# Patient Record
Sex: MALE | Race: WHITE | URBAN - METROPOLITAN AREA
[De-identification: names, ages, dates, MRNs, and addresses within clinical notes are randomized per-mention and may not be internally consistent; named-entity substitution may affect disease eponyms.]

---

## 2017-08-25 ENCOUNTER — TELEPHONE (OUTPATIENT)
Dept: CARDIOLOGY CLINIC | Age: 72
End: 2017-08-25

## 2017-09-28 ENCOUNTER — OFFICE VISIT (OUTPATIENT)
Dept: SURGERY | Age: 72
End: 2017-09-28

## 2017-09-28 VITALS
BODY MASS INDEX: 35.78 KG/M2 | HEIGHT: 73 IN | WEIGHT: 270 LBS | DIASTOLIC BLOOD PRESSURE: 80 MMHG | SYSTOLIC BLOOD PRESSURE: 131 MMHG

## 2017-09-28 DIAGNOSIS — K43.9 VENTRAL HERNIA WITHOUT OBSTRUCTION OR GANGRENE: Primary | ICD-10-CM

## 2017-09-28 PROCEDURE — 99204 OFFICE O/P NEW MOD 45 MIN: CPT | Performed by: SURGERY

## 2017-09-28 RX ORDER — FUROSEMIDE 40 MG/1
TABLET ORAL
Refills: 2 | COMMUNITY
Start: 2017-09-01

## 2017-09-28 RX ORDER — ATORVASTATIN CALCIUM 10 MG/1
TABLET, FILM COATED ORAL
Refills: 3 | COMMUNITY
Start: 2017-09-08

## 2017-09-28 RX ORDER — MELOXICAM 15 MG/1
15 TABLET ORAL DAILY PRN
COMMUNITY
Start: 2017-09-01

## 2017-09-28 ASSESSMENT — ENCOUNTER SYMPTOMS
COLOR CHANGE: 0
PHOTOPHOBIA: 0
CHOKING: 0
STRIDOR: 0
BACK PAIN: 0
APNEA: 0
EYE ITCHING: 0
CONSTIPATION: 0
ANAL BLEEDING: 0
EYE REDNESS: 0
ABDOMINAL PAIN: 1
SORE THROAT: 0
RECTAL PAIN: 0

## 2017-09-29 ENCOUNTER — TELEPHONE (OUTPATIENT)
Dept: SURGERY | Age: 72
End: 2017-09-29

## 2017-10-04 ENCOUNTER — INITIAL CONSULT (OUTPATIENT)
Dept: CARDIOLOGY CLINIC | Age: 72
End: 2017-10-04

## 2017-10-04 VITALS
DIASTOLIC BLOOD PRESSURE: 76 MMHG | WEIGHT: 288 LBS | HEART RATE: 70 BPM | RESPIRATION RATE: 16 BRPM | HEIGHT: 73 IN | BODY MASS INDEX: 38.17 KG/M2 | SYSTOLIC BLOOD PRESSURE: 122 MMHG

## 2017-10-04 DIAGNOSIS — R60.9 EDEMA, UNSPECIFIED TYPE: Primary | ICD-10-CM

## 2017-10-04 DIAGNOSIS — R06.02 SHORTNESS OF BREATH: ICD-10-CM

## 2017-10-04 DIAGNOSIS — R60.9 EDEMA, UNSPECIFIED TYPE: ICD-10-CM

## 2017-10-04 PROCEDURE — 93000 ELECTROCARDIOGRAM COMPLETE: CPT | Performed by: INTERNAL MEDICINE

## 2017-10-04 PROCEDURE — 99204 OFFICE O/P NEW MOD 45 MIN: CPT | Performed by: INTERNAL MEDICINE

## 2017-10-04 RX ORDER — POTASSIUM CHLORIDE 20 MEQ/1
20 TABLET, EXTENDED RELEASE ORAL 2 TIMES DAILY
COMMUNITY
End: 2017-10-04 | Stop reason: SDUPTHER

## 2017-10-04 RX ORDER — POTASSIUM CHLORIDE 1.5 G/1.77G
20 POWDER, FOR SOLUTION ORAL 2 TIMES DAILY
Qty: 30 EACH | Refills: 3 | Status: SHIPPED | OUTPATIENT
Start: 2017-10-04 | End: 2017-10-04 | Stop reason: ALTCHOICE

## 2017-10-04 RX ORDER — POTASSIUM CHLORIDE 20 MEQ/1
20 TABLET, EXTENDED RELEASE ORAL 2 TIMES DAILY
Qty: 60 TABLET | Refills: 6 | Status: SHIPPED | OUTPATIENT
Start: 2017-10-04 | End: 2018-01-24

## 2017-10-04 NOTE — MR AVS SNAPSHOT
After Visit Summary             Leonidas Panchal   10/4/2017 9:50 AM   Initial consult    Description:  Male : 1945   Provider:  Nina Nava MD   Department:  Cardiology [facility] and Future Appointments         Below is a list of your follow-up and future appointments. This may not be a complete list as you may have made appointments directly with providers that we are not aware of or your providers may have made some for you. Please call your providers to confirm appointments. It is important to keep your appointments. Please bring your current insurance card, photo ID, co-pay, and all medication bottles to your appointment. If self-pay, payment is expected at the time of service. Your To-Do List     Future Appointments Provider Department Dept Phone    10/17/2017 9:00 AM SCHEDULE, [facility] NUCLEAR [facility] [address] [phone]    10/17/2017 1:00 PM SCHEDULE, [facility] ECHO [facility] [address] [phone]    An echocardiogram is a test that uses sound waves to create a moving picture of the heart. The picture is much more detailed than a plain x-ray [email or web address]  image and involves no radiation exposure. 10/17/2017 2:00 PM SCHEDULE, [facility] ECHO [facility] [address] [phone]    An echocardiogram is a test that uses sound waves to create a moving picture of the heart. The picture is much more detailed than a plain x-ray [email or web address]  image and involves no radiation exposure. 10/18/2017 10:40 AM Nina Nava MD Cardiology [facility] [phone]    Please arrive 15 minutes prior to appointment, bring photo ID and insurance card.     10/20/2017 1:00 PM PREADMISSION TEST RM 1 [facility] Pre-Admission Testing [phone]    10/24/2017 7:30 AM Abigail Acosta MD; OR [address] [facility] General Surgery [phone]    11/6/2017 10:00 AM Samia Christopher MD [facility] GENERAL SURGERY [phone]    Future Orders Complete By Expires    ECHO Complete 2D W Doppler W Color [52847 Custom]  10/4/2017 10/4/2018    NM Myocardial Spect Rest Exercise or Rx [66770 Custom]  10/4/2017 10/4/2018    VL DUP LOWER EXTREMITY VENOUS BILATERAL [20941 Custom]  10/4/2017 97/2/7851    Basic Metabolic Panel [XTZ27 Custom]  10/5/2017 (Approximate) 10/4/2018    Standing Orders Interval Expires    EKG 12 Lead [EKG1 Custom]   10/4/2019    Follow-Up    Return in about 2 weeks (around 10/18/2017). Information from Your Visit        Department     Name Address Phone Fax    Cardiology [facility] [address] [address] [phone]      You Were Seen for:         Comments    Edema, unspecified type   [8287645]         Vital Signs     Blood Pressure Pulse Respirations Height Weight Body Mass Index    122/76 (Site: Left Arm, Position: Sitting, Cuff Size: Large Adult) 79 16 6' 1\" (1.854 m) 288 lb (130.6 kg) 38 kg/m2    Smoking Status                   Former Smoker           Additional Information about your Body Mass Index (BMI)           Your BMI as listed above is considered obese (30 or more). BMI is an estimate of body fat, calculated from your height and weight. The higher your BMI, the greater your risk of heart disease, high blood pressure, type 2 diabetes, stroke, gallstones, arthritis, sleep apnea, and certain cancers. BMI is not perfect. It may overestimate body fat in athletes and people who are more muscular. Even a small weight loss (between 5 and 10 percent of your current weight) by decreasing your calorie intake and becoming more physically active will help lower your risk of developing or worsening diseases associated with obesity.      Learn more at: From The Bench.co.uk             Today's Medication Changes These changes are accurate as of: 10/4/17 10:24 AM.  If you have any questions, ask your nurse or doctor. START taking these medications           potassium chloride 20 MEQ packet   Commonly known as:  KLOR-CON   Instructions: Take 20 mEq by mouth 2 times daily   Quantity:  30 each   Refills:  3   Started by:  Iesha Dubose MD         STOP taking these medications           silver sulfADIAZINE 1 % cream   Commonly known as:  SILVADENE   Stopped by:  Iesha Dubose MD       UNABLE TO FIND   Stopped by:  Iesha Dubose MD            Where to Get Your Medications      These medications were sent to [facility] Jae Mcintyre, [address] [phone] Tho Contreras [phone]  [facility], [address] [address]     Phone:  [phone]     potassium chloride 20 MEQ packet               Your Current Medications Are              potassium chloride (KLOR-CON) 20 MEQ packet Take 20 mEq by mouth 2 times daily    atorvastatin (LIPITOR) 10 MG tablet TAKE 1 TABLET BY MOUTH AT BEDTIME    furosemide (LASIX) 40 MG tablet TAKE 1 TABLET(S) BY MOUTH DAILY    meloxicam (MOBIC) 15 MG tablet     amLODIPine (NORVASC) 5 MG tablet Take 5 mg by mouth every morning. losartan (COZAAR) 50 MG tablet Take 50 mg by mouth 2 times daily. metoprolol (TOPROL XL) 50 MG XL tablet Take 50 mg by mouth 2 times daily. hydrochlorothiazide (HYDRODIURIL) 25 MG tablet Take 25 mg by mouth every morning. cloNIDine (CATAPRES) 0.1 MG tablet Take 0.1 mg by mouth nightly. Multiple Vitamins-Minerals (MULTIVITAMIN PO) Take  by mouth every morning. Over The Counter    aspirin 81 MG tablet Take 81 mg by mouth nightly.  Over The Counter, Last Dose Taken 9-16-13 Due To Scheduled Surgery      Allergies              Codeine     \"Hallucinations\"    Other     \"Allergic To Lobster Causing Swelling\"    Tape Glendo Downs Tape]     \"Makes Skin Look Like It's Burnt, And Causes Blisters\"

## 2017-10-04 NOTE — PROGRESS NOTES
atorvastatin (LIPITOR) 10 MG tablet TAKE 1 TABLET BY MOUTH AT BEDTIME  3    furosemide (LASIX) 40 MG tablet TAKE 1 TABLET(S) BY MOUTH DAILY  2    meloxicam (MOBIC) 15 MG tablet       amLODIPine (NORVASC) 5 MG tablet Take 5 mg by mouth every morning.  losartan (COZAAR) 50 MG tablet Take 50 mg by mouth 2 times daily.  metoprolol (TOPROL XL) 50 MG XL tablet Take 50 mg by mouth 2 times daily.  hydrochlorothiazide (HYDRODIURIL) 25 MG tablet Take 25 mg by mouth every morning.  cloNIDine (CATAPRES) 0.1 MG tablet Take 0.1 mg by mouth nightly.  Multiple Vitamins-Minerals (MULTIVITAMIN PO) Take  by mouth every morning. Over The Counter      aspirin 81 MG tablet Take 81 mg by mouth nightly. Over The Counter, Last Dose Taken 9-16-13 Due To Scheduled Surgery       No current facility-administered medications for this visit. Review of Systems:   · Constitutional: No Fever or Weight Loss   · Eyes: No Decreased Vision  · ENT: No Headaches, Hearing Loss or Vertigo  · Cardiovascular: No chest pain, dyspnea on exertion, palpitations or loss of consciousness  · Respiratory: No cough or wheezing    · Gastrointestinal: No abdominal pain, appetite loss, blood in stools, constipation, diarrhea or heartburn  · Genitourinary: No dysuria, trouble voiding, or hematuria  · Musculoskeletal: + leg swelling, No gait disturbance, weakness or joint complaints  · Integumentary: No rash or pruritis  · Neurological: No TIA or stroke symptoms  · Psychiatric: No anxiety or depression  · Endocrine: No malaise, fatigue or temperature intolerance  · Hematologic/Lymphatic: No bleeding problems, blood clots or swollen lymph nodes  · Allergic/Immunologic: No nasal congestion or hives  All systems negative except as marked.      ·   ·      Physical Examination:    Vitals:    10/04/17 0952   BP: 122/76   Pulse: 70   Resp: 16    afebrile  Height 6'1''  Wt Readings from Last 3 Encounters:   10/04/17 288 lb (130.6 kg)   09/28/17 270

## 2017-10-05 ENCOUNTER — HOSPITAL ENCOUNTER (OUTPATIENT)
Dept: LAB | Age: 72
Discharge: HOME | End: 2017-10-05
Attending: INTERNAL MEDICINE | Admitting: INTERNAL MEDICINE

## 2017-10-05 LAB
ANION GAP SERPL CALCULATED.3IONS-SCNC: 9 MMOL/L (ref 4–16)
BUN BLDV-MCNC: 22 MG/DL (ref 6–23)
CALCIUM SERPL-MCNC: 9.3 MG/DL (ref 8.3–10.6)
CHLORIDE BLD-SCNC: 100 MMOL/L (ref 99–110)
CO2: 32 MMOL/L (ref 21–32)
CREAT SERPL-MCNC: 0.9 MG/DL (ref 0.9–1.3)
GFR AFRICAN AMERICAN: >60 ML/MIN/1.73M2
GFR NON-AFRICAN AMERICAN: >60 ML/MIN/1.73M2
GLUCOSE BLD-MCNC: 99 MG/DL (ref 70–140)
POTASSIUM SERPL-SCNC: 4.6 MMOL/L (ref 3.5–5.1)
SODIUM BLD-SCNC: 141 MMOL/L (ref 135–145)

## 2017-10-10 ENCOUNTER — HOSPITAL ENCOUNTER (OUTPATIENT)
Dept: GENERAL RADIOLOGY | Age: 72
Discharge: HOME | End: 2017-10-10
Attending: PODIATRIST | Admitting: PODIATRIST

## 2017-10-10 DIAGNOSIS — G89.29 CHRONIC PAIN OF TOE OF RIGHT FOOT: ICD-10-CM

## 2017-10-10 DIAGNOSIS — M79.674 CHRONIC PAIN OF TOE OF RIGHT FOOT: ICD-10-CM

## 2017-10-11 ENCOUNTER — TELEPHONE (OUTPATIENT)
Dept: CARDIOLOGY CLINIC | Age: 72
End: 2017-10-11

## 2017-10-11 NOTE — TELEPHONE ENCOUNTER
Returned call to Marciano Argue his potassium is 4.6 and he has not been taking any potassium. I instructed him to start taking potassium once a day.

## 2017-10-18 ENCOUNTER — OFFICE VISIT (OUTPATIENT)
Dept: CARDIOLOGY CLINIC | Age: 72
End: 2017-10-18

## 2017-10-18 ENCOUNTER — PROCEDURE VISIT (OUTPATIENT)
Dept: CARDIOLOGY CLINIC | Age: 72
End: 2017-10-18

## 2017-10-18 VITALS
HEART RATE: 72 BPM | BODY MASS INDEX: 38.57 KG/M2 | SYSTOLIC BLOOD PRESSURE: 124 MMHG | DIASTOLIC BLOOD PRESSURE: 80 MMHG | RESPIRATION RATE: 16 BRPM | WEIGHT: 291 LBS | HEIGHT: 73 IN

## 2017-10-18 DIAGNOSIS — R06.02 SHORTNESS OF BREATH: ICD-10-CM

## 2017-10-18 DIAGNOSIS — R60.9 EDEMA, UNSPECIFIED TYPE: Primary | ICD-10-CM

## 2017-10-18 DIAGNOSIS — M79.89 LEG SWELLING: Primary | ICD-10-CM

## 2017-10-18 DIAGNOSIS — R60.9 EDEMA, UNSPECIFIED TYPE: ICD-10-CM

## 2017-10-18 DIAGNOSIS — R06.02 SHORTNESS OF BREATH: Primary | ICD-10-CM

## 2017-10-18 LAB
LV EF: 58 %
LV EF: 60 %
LVEF MODALITY: NORMAL
LVEF MODALITY: NORMAL

## 2017-10-18 PROCEDURE — 93015 CV STRESS TEST SUPVJ I&R: CPT | Performed by: INTERNAL MEDICINE

## 2017-10-18 PROCEDURE — 93306 TTE W/DOPPLER COMPLETE: CPT | Performed by: INTERNAL MEDICINE

## 2017-10-18 PROCEDURE — A9500 TC99M SESTAMIBI: HCPCS | Performed by: INTERNAL MEDICINE

## 2017-10-18 PROCEDURE — 93970 EXTREMITY STUDY: CPT | Performed by: INTERNAL MEDICINE

## 2017-10-18 PROCEDURE — 78452 HT MUSCLE IMAGE SPECT MULT: CPT | Performed by: INTERNAL MEDICINE

## 2017-10-18 PROCEDURE — 99214 OFFICE O/P EST MOD 30 MIN: CPT | Performed by: INTERNAL MEDICINE

## 2017-10-18 NOTE — PROGRESS NOTES
Romulo Delatorre MD        OFFICE  FOLLOWUP NOTE    Chief complaints: patient is here for management of preop,leg swelling, HTN, dyslipidemia    Subjective: patient feels better, no chest pain, no shortness of breath, no dizziness, no palpitations    HPI Porfirio Sanchez is a 81 y. o.year old who  has a past medical history of Anemia; Back problem; Diabetes mellitus ([address] Fall; [place] (hard of hearing); HX OTHER MEDICAL; Hyperlipidemia; Hypertension; OCD (obsessive compulsive disorder); Osteomyel[address] ([facility] [address] Pre-diabetes; Shortness of breath on exertion; and Wears glasses. and presents for management of preop,leg swelling, HTN, dyslipidemia  , which are well controlled      Current Outpatient Prescriptions   Medication Sig Dispense Refill    potassium chloride (KLOR-CON M) 20 MEQ extended release tablet Take 1 tablet by mouth 2 times daily 60 tablet 6    atorvastatin (LIPITOR) 10 MG tablet TAKE 1 TABLET BY MOUTH AT BEDTIME  3    furosemide (LASIX) 40 MG tablet TAKE 1 TABLET(S) BY MOUTH DAILY  2    meloxicam (MOBIC) 15 MG tablet       amLODIPine (NORVASC) 5 MG tablet Take 5 mg by mouth every morning.  losartan (COZAAR) 50 MG tablet Take 50 mg by mouth 2 times daily.  metoprolol (TOPROL XL) 50 MG XL tablet Take 50 mg by mouth 2 times daily.  hydrochlorothiazide (HYDRODIURIL) 25 MG tablet Take 25 mg by mouth every morning.  cloNIDine (CATAPRES) 0.1 MG tablet Take 0.1 mg by mouth nightly.  Multiple Vitamins-Minerals (MULTIVITAMIN PO) Take  by mouth every morning. Over The Counter      aspirin 81 MG tablet Take 81 mg by mouth nightly. Over The Counter, Last Dose Taken 9-16-13 Due To Scheduled Surgery       No current facility-administered medications for this visit. Allergies: Codeine;  Other; and Tape [adhesive tape]  Past Medical History:   Diagnosis Date    Anemia 1982    \"While In The Hospital\"    Back problem     \"Back Goes Out Sometimes, Have Couple Herniated Discs That Cause My Feet To Go Numb\"    Diabetes mellitus ([address]     \"Borderline\"    Fall 1982    \"Fell Off Ladder\"   [place] (hard of hearing)     \"Both Ears\"    HX OTHER MEDICAL     Primary Care Physician Is Dr[facility] Carloz [facility]app [facility]    Hyperlipidemia     Hypertension     OCD (obsessive compulsive disorder)     Osteomyelitis [address] [address]     Pre-diabetes     Shortness of breath on exertion     Wears glasses      Past Surgical History:   Procedure Laterality Date    AMPUTATION      COLONOSCOPY  2000's    FRACTURE SURGERY Left 1982    Broken Left Ankle, Fell Off A Ladder    FRACTURE SURGERY Right 1982    \"Crushed Heel Right Foot, Fell Off A Ladder\"     Family History   Problem Relation Age of Onset    Asthma Mother     Cancer Mother      \"Skin Cancer\"   Chloé Mcmillan Hearing Loss Mother     Heart Disease Mother      \"Pacemaker\"    Cancer Father      \"Lymphoma\"    Other Father      \"Back Problems\"    Early Death Brother 64     \"Pulmonary Embolism\"    Asthma Brother     Other Brother      \"Complications From Being Shot Up In [place]"     Social History   Substance Use Topics    Smoking status: Former Smoker     Packs/day: 2.00     Years: 9.00     Start date: 9/17/1966     Quit date: 1/16/1975    Smokeless tobacco: Never Used    Alcohol use No      [unfilled]  Review of Systems:   · Constitutional: No Fever or Weight Loss   · Eyes: No Decreased Vision  · ENT: No Headaches, Hearing Loss or Vertigo  · Cardiovascular: No chest pain, dyspnea on exertion, palpitations or loss of consciousness  · Respiratory: No cough or wheezing    · Gastrointestinal: No abdominal pain, appetite loss, blood in stools, constipation, diarrhea or heartburn  · Genitourinary: No dysuria, trouble voiding, or hematuria  · Musculoskeletal: + leg swelling,.  No gait disturbance, weakness or joint complaints  · Integumentary: No rash or pruritis  · Neurological: No TIA or stroke symptoms  · Psychiatric: No anxiety or depression  · Endocrine: No malaise, fatigue or temperature intolerance  · Hematologic/Lymphatic: No bleeding problems, blood clots or swollen lymph nodes  · Allergic/Immunologic: No nasal congestion or hives  All systems negative except as marked. Objective:  /80 (Site: Left Arm, Position: Sitting, Cuff Size: Large Adult)   Pulse 72   Resp 16   Ht 6' 1\" (1.854 m)   Wt 291 lb (132 kg)   BMI 38.39 kg/m²   Wt Readings from Last 3 Encounters:   10/18/17 291 lb (132 kg)   10/04/17 288 lb (130.6 kg)   09/28/17 270 lb (122.5 kg)     Body mass index is 38.39 kg/m². GENERAL - Alert, oriented, pleasant, in no apparent distress,normal grooming  HEENT  pupils are reactive to light and accomodation, cornea intact, conjunctive normal color, ears are normal in exam,throat exam in normal, teeth, gum and palate are normal, oral mucosa is normal without any notation of pallor or cyanosis  Neck - Supple. No jugular venous distention noted. No carotid bruits, no apical -carotid delay  Respiratory:  Normal breath sounds, No respiratory distress, No wheezing, No chest tenderness. ,no use of accessory muscles, diaphragm movement is normal  Cardiovascular: (PMI) apex non displaced,no lifts no thrills, no s3,no s4, Normal heart rate, Normal rhythm, No murmurs, No rubs, No gallops. Carotid arteries pulse and amplitude are normal no bruit, no abdominal bruit noted ( normal abdominal aorta ausculation), femoral arteries pulse and amplitude are normal no bruit, pedal pulses are normal  Femoral pulses have normal amplitude, no bruits   Extremities - No cyanosis, clubbing, or significant edema, no varicose veins    Abdomen  No masses, tenderness, or organomegaly, no hepato-splenomegally, no bruits  Musculoskeletal  + edema, no kyphosis or scoliosis, no deformity in any extremity noted, muscle strength and tone are normal  Skin: no ulcer,no scar,no stasis dermatitis   Neurologic  alert oriented times 3,Cranial nerves II through XII are grossly intact.   There were no gross focal neurologic abnormalities. All sensory and motor nerves examined and were normal  Psychiatric: normal mood and affect    No results found for: CKTOTAL, CKMB, CKMBINDEX, TROPONINI  BNP:  No results found for: BNP  PT/INR:  No results found for: PTINR  No results found for: LABA1C  No results found for: CHOL, TRIG, HDL, LDLCALC, LDLDIRECT  No results found for: ALT, AST  TSH:  No results found for: TSH    Impression:  Massimo Bansal is a 81 y. o.year old who  has a past medical history of Anemia; Back problem; Diabetes mellitus ([address] Fall; [place] (hard of hearing); HX OTHER MEDICAL; Hyperlipidemia; Hypertension; OCD (obsessive compulsive disorder); Osteomyelitis ([facility] [address] Pre-diabetes; Shortness of breath on exertion; and Wears glasses. and presents with     Plan:  1. Shortness of breath: stress test and echo are normal, recommend to lose weight  2. preop for ventral hernia: cleared for surgery  3. Leg swelling: venous doppler showed venous reflux, will recommend compression socks, and if fail to improve,will recommend venous ablation. norvasc can also cause leg swelling. 4. Pre diabetes: recommend to lose weight  5. Obesity: will start adipex after stress test  6. Dyslipidemia: stable continue statins  7. HTN: stable, continue lopressor, losartan, hctz and clonidine medicatons  1. Patient takes 2 diuretics, will recommend to check potassium and add kcl as neededHealth maintenance: exerise and diet  All labs, medications and tests reviewed, continue all other medications of all above medical condition listed as is.     [unfilled]

## 2017-10-20 ENCOUNTER — HOSPITAL ENCOUNTER (OUTPATIENT)
Dept: PREADMISSION TESTING | Age: 72
Discharge: HOME | End: 2017-10-20
Attending: SURGERY | Admitting: SURGERY

## 2017-10-20 VITALS
WEIGHT: 291 LBS | SYSTOLIC BLOOD PRESSURE: 140 MMHG | HEIGHT: 73 IN | OXYGEN SATURATION: 92 % | TEMPERATURE: 97.6 F | BODY MASS INDEX: 38.57 KG/M2 | HEART RATE: 80 BPM | DIASTOLIC BLOOD PRESSURE: 72 MMHG | RESPIRATION RATE: 16 BRPM

## 2017-11-06 ENCOUNTER — OFFICE VISIT (OUTPATIENT)
Dept: SURGERY | Age: 72
End: 2017-11-06

## 2017-11-06 VITALS
SYSTOLIC BLOOD PRESSURE: 118 MMHG | WEIGHT: 280 LBS | DIASTOLIC BLOOD PRESSURE: 71 MMHG | BODY MASS INDEX: 37.11 KG/M2 | HEART RATE: 76 BPM | HEIGHT: 73 IN

## 2017-11-06 DIAGNOSIS — K43.9 VENTRAL HERNIA WITHOUT OBSTRUCTION OR GANGRENE: Primary | ICD-10-CM

## 2017-11-06 PROCEDURE — 99024 POSTOP FOLLOW-UP VISIT: CPT | Performed by: SURGERY

## 2017-11-20 ENCOUNTER — OFFICE VISIT (OUTPATIENT)
Dept: SURGERY | Age: 72
End: 2017-11-20

## 2017-11-20 VITALS
WEIGHT: 280 LBS | BODY MASS INDEX: 37.11 KG/M2 | SYSTOLIC BLOOD PRESSURE: 127 MMHG | DIASTOLIC BLOOD PRESSURE: 76 MMHG | HEART RATE: 88 BPM | HEIGHT: 73 IN

## 2017-11-20 DIAGNOSIS — K43.9 VENTRAL HERNIA WITHOUT OBSTRUCTION OR GANGRENE: Primary | ICD-10-CM

## 2017-11-20 PROCEDURE — 99024 POSTOP FOLLOW-UP VISIT: CPT | Performed by: SURGERY

## 2017-11-20 NOTE — PROGRESS NOTES
Chief Complaint   Patient presents with    Post-Op Check     c/c-post op check- open [facility] 10/24/17         SUBJECTIVE:  Patient here for post op visit s/pOpen ventral Herniorraphy with mesh, 10/24/17     . This is 2nd visit. Pain is minimal.  Wounds: min bruising and no discharge. OBJECTIVE:  Physical Exam   Constitutional: He is oriented to person, place, and time. He appears well-developed and well-nourished. No distress. Abdominal: Soft. Bowel sounds are normal. He exhibits no distension and no mass. There is no tenderness. There is no rebound and no guarding. Neurological: He is alert and oriented to person, place, and time. He has normal reflexes. He displays normal reflexes. No cranial nerve deficit. He exhibits normal muscle tone. Coordination normal.   Skin: Skin is warm and dry. No rash noted. He is not diaphoretic. No erythema. No pallor. Wound well healed without signs of active infection. Suture line intact. Abdomen soft, nontender, nondistended. ASSESSMENT:  Patient doing well on this post operative check. Wounds well healed. 1. Ventral hernia without obstruction or gangrene        PLAN:  Continue same  Increase activity as tolerated  F/U PRN        No orders of the defined types were placed in this encounter. No orders of the defined types were placed in this encounter. Follow Up: Return if symptoms worsen or fail to improve.     Candido Calderon PA-C

## 2018-01-24 ENCOUNTER — OFFICE VISIT (OUTPATIENT)
Dept: CARDIOLOGY CLINIC | Age: 73
End: 2018-01-24

## 2018-01-24 VITALS
WEIGHT: 278 LBS | HEIGHT: 73 IN | SYSTOLIC BLOOD PRESSURE: 122 MMHG | HEART RATE: 64 BPM | BODY MASS INDEX: 36.84 KG/M2 | DIASTOLIC BLOOD PRESSURE: 76 MMHG

## 2018-01-24 DIAGNOSIS — I10 ESSENTIAL HYPERTENSION: Primary | ICD-10-CM

## 2018-01-24 PROCEDURE — 99214 OFFICE O/P EST MOD 30 MIN: CPT | Performed by: INTERNAL MEDICINE

## 2018-01-24 NOTE — PROGRESS NOTES
Wing Aracelis MD        OFFICE  FOLLOWUP NOTE    Chief complaints: patient is here for management of post op hernia repair,leg swelling, HTN, dyslipidemia    Subjective: patient feels better, no chest pain, no shortness of breath, no dizziness, no palpitations    HPI Carlene Patino is a 81 y. o.year old who  has a past medical history of Anemia; Back problem; Diabetes mellitus ([facility] [address] Fall; H/O echocardiogram; History of nuclear stress test; [place] (hard of hearing); HX OTHER MEDICAL; Hyperlipidemia; Hypertension; Leg swelling; OCD (obsessive compulsive disorder); Osteomyelitis ([facility] [address] Pre-diabetes; Seasonal allergies; Shortness of breath on exertion; and Wears glasses. and presents for management of post op hernia repair,leg swelling, HTN, dyslipidemia  which are well controlled      Current Outpatient Prescriptions   Medication Sig Dispense Refill    Omega-3 Fatty Acids (EQL OMEGA 3 FISH OIL) 1400 MG CAPS Take 1 capsule by mouth daily      Compression Stockings MISC by Does not apply route 1 each 0    atorvastatin (LIPITOR) 10 MG tablet TAKE 1 TABLET BY MOUTH AT BEDTIME  3    furosemide (LASIX) 40 MG tablet TAKE 1 TABLET(S) BY MOUTH DAILY   pt takes in evening  2    meloxicam (MOBIC) 15 MG tablet 15 mg daily as needed       amLODIPine (NORVASC) 5 MG tablet Take 5 mg by mouth every morning.  losartan (COZAAR) 50 MG tablet Take 50 mg by mouth 2 times daily.  metoprolol (TOPROL XL) 50 MG XL tablet Take 50 mg by mouth daily Pt takes 1/2 tab, twice daily.  hydrochlorothiazide (HYDRODIURIL) 25 MG tablet Take 25 mg by mouth every morning.  cloNIDine (CATAPRES) 0.1 MG tablet Take 0.1 mg by mouth nightly.  Multiple Vitamins-Minerals (MULTIVITAMIN PO) Take  by mouth every morning. Over The Counter      aspirin 81 MG tablet Take 81 mg by mouth nightly Over The Counter       No current facility-administered medications for this visit. Allergies:  Other; Codeine; and Tape Yvrose Lerma tape]  Past Medical History:   Diagnosis Date    Anemia 1982    \"While In The Hospital\"    Back problem     \"Back Goes Out Sometimes, Have Couple Herniated Discs That Cause My Feet To Go Numb\"    Diabetes mellitus ([address]     \"Borderline\"    Fall 1982    \"Fell Off Ladder\" -- Lt ankle and Rt heel fractures    H/O echocardiogram 10/18/2017    EF 55-60. Grade I diastolic dysfunction. Mild concentric left ventricular hypertrophy. The left atrium is mildly dilated. No significant valvulopathy seen.  History of nuclear stress test 10/18/2017    EF 60%. Normal study.     [place] (hard of hearing)     \"Both Ears\"    HX OTHER MEDICAL     Primary Care Physician Is Dr. Roni Mendiolaman, [facility]    Hyperlipidemia     Hypertension     Leg swelling     Rt leg greater than Lt--reduces somewhat overnight    OCD (obsessive compulsive disorder)     Osteomyelitis ([address]     tip of Rt 2nd toe--was amputated in 2013    Pre-diabetes     Seasonal allergies     hayfever    Shortness of breath on exertion     Wears glasses      Past Surgical History:   Procedure Laterality Date    AMPUTATION  09/20/2013    Right 2nd toe--distal tip     COLONOSCOPY  2000's    FRACTURE SURGERY Left 1982    Broken Left Ankle, Fell Off A Ladder    FRACTURE SURGERY Right 1982    \"Crushed Heel Right Foot, Fell Off A Ladder\"    VENTRAL HERNIA REPAIR  10/24/2017     Family History   Problem Relation Age of Onset    Asthma Mother     Cancer Mother      \"Skin Cancer\"   Freda Gourd Hearing Loss Mother     Heart Disease Mother      \"Pacemaker\"    Cancer Father      \"Lymphoma\"    Other Father      \"Back Problems\"    Early Death Brother 64     \"Pulmonary Embolism\"    Asthma Brother     Other Brother      \"Complications From Being Shot Up In [employer]"     Social History   Substance Use Topics    Smoking status: Former Smoker     Packs/day: 2.00     Years: 9.00     Start date: 9/17/1966     Quit date: 1/16/1975    Smokeless tobacco: Never Used    Alcohol use

## 2018-02-18 PROBLEM — L03.031 CELLULITIS OF THIRD TOE OF RIGHT FOOT: Status: ACTIVE | Noted: 2018-02-18

## 2018-02-21 PROBLEM — L03.031 CELLULITIS OF THIRD TOE OF RIGHT FOOT: Status: RESOLVED | Noted: 2018-02-18 | Resolved: 2018-02-21

## 2018-03-28 ENCOUNTER — OFFICE VISIT (OUTPATIENT)
Dept: CARDIOLOGY CLINIC | Age: 73
End: 2018-03-28

## 2018-03-28 ENCOUNTER — HOSPITAL ENCOUNTER (OUTPATIENT)
Dept: GENERAL RADIOLOGY | Age: 73
Discharge: HOME | End: 2018-03-28
Attending: PODIATRIST | Admitting: PODIATRIST

## 2018-03-28 VITALS
SYSTOLIC BLOOD PRESSURE: 122 MMHG | HEIGHT: 73 IN | RESPIRATION RATE: 14 BRPM | DIASTOLIC BLOOD PRESSURE: 78 MMHG | HEART RATE: 64 BPM | BODY MASS INDEX: 36.71 KG/M2 | WEIGHT: 277 LBS

## 2018-03-28 DIAGNOSIS — M20.41 ACQUIRED HAMMER TOE OF RIGHT FOOT: ICD-10-CM

## 2018-03-28 DIAGNOSIS — M79.671 RIGHT FOOT PAIN: ICD-10-CM

## 2018-03-28 DIAGNOSIS — L97.511 RIGHT FOOT ULCER, LIMITED TO BREAKDOWN OF SKIN (HCC): ICD-10-CM

## 2018-03-28 DIAGNOSIS — R60.9 EDEMA, UNSPECIFIED TYPE: ICD-10-CM

## 2018-03-28 PROCEDURE — 99214 OFFICE O/P EST MOD 30 MIN: CPT | Performed by: INTERNAL MEDICINE

## 2018-03-28 PROCEDURE — 93000 ELECTROCARDIOGRAM COMPLETE: CPT | Performed by: INTERNAL MEDICINE

## 2018-03-28 NOTE — PROGRESS NOTES
facility-administered medications for this visit. Allergies: Other; Codeine; and Tape [adhesive tape]  Past Medical History:   Diagnosis Date    Anemia 1982    \"While In The Hospital\"    Back problem     \"Back Goes Out Sometimes, Have Couple Herniated Discs That Cause My Feet To Go Numb\"    Diabetes mellitus ([address]     \"Borderline\"    Fall 1982    \"Fell Off Ladder\" -- Lt ankle and Rt heel fractures    H/O echocardiogram 10/18/2017    EF 55-60. Grade I diastolic dysfunction. Mild concentric left ventricular hypertrophy. The left atrium is mildly dilated. No significant valvulopathy seen.  History of nuclear stress test 10/18/2017    EF 60%. Normal study.     [place] (hard of hearing)     \"Both Ears\"    HX OTHER MEDICAL     Primary Care Physician Is Dr. Alberto [facility]    Hyperlipidemia     Hypertension     Leg swelling     Rt leg greater than Lt--reduces somewhat overnight    OCD (obsessive compulsive disorder)     Osteomyelitis ([address]     tip of Rt 2nd toe--was amputated in 2013    Pre-diabetes     Seasonal allergies     hayfever    Shortness of breath on exertion     Wears glasses      Past Surgical History:   Procedure Laterality Date    AMPUTATION  09/20/2013    Right 2nd toe--distal tip     COLONOSCOPY  2000's    FRACTURE SURGERY Left 1982    Broken Left Ankle, Fell Off A Ladder    FRACTURE SURGERY Right 1982    \"Crushed Heel Right Foot, Fell Off A Ladder\"    VENTRAL HERNIA REPAIR  10/24/2017     Family History   Problem Relation Age of Onset    Asthma Mother     Cancer Mother      \"Skin Cancer\"   Aguilar Falter Hearing Loss Mother     Heart Disease Mother      \"Pacemaker\"    Cancer Father      \"Lymphoma\"    Other Father      \"Back Problems\"    Early Death Brother 64     \"Pulmonary Embolism\"    Asthma Brother     Other Brother      \"Complications From Being Shot Up In [place]"     Social History   Substance Use Topics    Smoking status: Former Smoker     Packs/day: 2.00     Years: 9.00 thrills, no s3,no s4, Normal heart rate, Normal rhythm, No murmurs, No rubs, No gallops. Carotid arteries pulse and amplitude are normal no bruit, no abdominal bruit noted ( normal abdominal aorta ausculation), femoral arteries pulse and amplitude are normal no bruit, pedal pulses are normal  Femoral pulses have normal amplitude, no bruits   Extremities - No cyanosis, clubbing, or significant edema, no varicose veins    Abdomen  No masses, tenderness, or organomegaly, no hepato-splenomegally, no bruits  Musculoskeletal  No significant edema, no kyphosis or scoliosis, no deformity in any extremity noted, muscle strength and tone are normal  Skin: no ulcer,no scar,no stasis dermatitis   Neurologic  alert oriented times 3,Cranial nerves II through XII are grossly intact. There were no gross focal neurologic abnormalities. All sensory and motor nerves examined and were normal  Psychiatric: normal mood and affect    No results found for: CKTOTAL, CKMB, CKMBINDEX, TROPONINI  BNP:  No results found for: BNP  PT/INR:  No results found for: PTINR  Lab Results   Component Value Date    LABA1C 5.8 02/19/2018     No results found for: CHOL, TRIG, HDL, LDLCALC, LDLDIRECT  Lab Results   Component Value Date    ALT 25 02/18/2018    AST 23 02/18/2018     TSH:  No results found for: TSH    Impression:  An Roberts is a 72 y. o.year old who  has a past medical history of Anemia; Back problem; Diabetes mellitus ([address] Fall; H/O echocardiogram; History of nuclear stress test; [place] (hard of hearing); HX OTHER MEDICAL; Hyperlipidemia; Hypertension; Leg swelling; OCD (obsessive compulsive disorder); Osteomyelitis ([address] Pre-diabetes; Seasonal allergies; Shortness of breath on exertion; and Wears glasses. and presents with     Plan:  1. preop: ok from cardiac standpoint on medium risk  2. Pre diabetes: recommend to lose weight  3. Obesity: patient is reluctant to start adipex  4. Dyslipidemia: stable continue statins  5.  HTN: stable, continue lopressor, losartan, hctz and clonidine   6. Health maintenance: exerise and diet  All labs, medications and tests reviewed, continue all other medications of all above medical condition listed as is.     [unfilled]

## 2018-04-02 ENCOUNTER — TELEPHONE (OUTPATIENT)
Dept: CARDIOLOGY CLINIC | Age: 73
End: 2018-04-02

## 2018-04-25 ENCOUNTER — HOSPITAL ENCOUNTER (OUTPATIENT)
Dept: LAB | Age: 73
Discharge: HOME | End: 2018-04-25
Attending: PODIATRIST | Admitting: PODIATRIST

## 2018-04-25 DIAGNOSIS — Z01.818 PREOP EXAMINATION: ICD-10-CM

## 2018-04-25 LAB
ANION GAP SERPL CALCULATED.3IONS-SCNC: 14 MMOL/L (ref 4–16)
BUN BLDV-MCNC: 22 MG/DL (ref 6–23)
CALCIUM SERPL-MCNC: 9.2 MG/DL (ref 8.3–10.6)
CHLORIDE BLD-SCNC: 101 MMOL/L (ref 99–110)
CO2: 26 MMOL/L (ref 21–32)
CREAT SERPL-MCNC: 0.9 MG/DL (ref 0.9–1.3)
EKG ATRIAL RATE: 59 BPM
EKG DIAGNOSIS: NORMAL
EKG P AXIS: 55 DEGREES
EKG P-R INTERVAL: 200 MS
EKG Q-T INTERVAL: 436 MS
EKG QRS DURATION: 110 MS
EKG QTC CALCULATION (BAZETT): 431 MS
EKG R AXIS: -38 DEGREES
EKG T AXIS: 11 DEGREES
EKG VENTRICULAR RATE: 59 BPM
GFR AFRICAN AMERICAN: >60 ML/MIN/1.73M2
GFR NON-AFRICAN AMERICAN: >60 ML/MIN/1.73M2
GLUCOSE BLD-MCNC: 93 MG/DL (ref 70–99)
HCT VFR BLD CALC: 44.9 % (ref 42–52)
HEMOGLOBIN: 14.4 GM/DL (ref 13.5–18)
MCH RBC QN AUTO: 28.3 PG (ref 27–31)
MCHC RBC AUTO-ENTMCNC: 32.1 % (ref 32–36)
MCV RBC AUTO: 88.4 FL (ref 78–100)
PDW BLD-RTO: 15 % (ref 11.7–14.9)
PLATELET # BLD: 221 K/CU MM (ref 140–440)
PMV BLD AUTO: 9.4 FL (ref 7.5–11.1)
POTASSIUM SERPL-SCNC: 4.5 MMOL/L (ref 3.5–5.1)
RBC # BLD: 5.08 M/CU MM (ref 4.6–6.2)
SODIUM BLD-SCNC: 141 MMOL/L (ref 135–145)
WBC # BLD: 8.5 K/CU MM (ref 4–10.5)

## 2018-06-27 ENCOUNTER — OFFICE VISIT (OUTPATIENT)
Dept: CARDIOLOGY CLINIC | Age: 73
End: 2018-06-27

## 2018-06-27 VITALS
HEART RATE: 64 BPM | HEIGHT: 73 IN | BODY MASS INDEX: 38.3 KG/M2 | SYSTOLIC BLOOD PRESSURE: 120 MMHG | DIASTOLIC BLOOD PRESSURE: 76 MMHG | WEIGHT: 289 LBS

## 2018-06-27 DIAGNOSIS — E66.09 CLASS 1 OBESITY DUE TO EXCESS CALORIES WITH SERIOUS COMORBIDITY IN ADULT, UNSPECIFIED BMI: ICD-10-CM

## 2018-06-27 DIAGNOSIS — I10 ESSENTIAL HYPERTENSION: Primary | ICD-10-CM

## 2018-06-27 PROCEDURE — 99214 OFFICE O/P EST MOD 30 MIN: CPT | Performed by: INTERNAL MEDICINE

## 2018-06-27 RX ORDER — PHENTERMINE HYDROCHLORIDE 37.5 MG/1
37.5 TABLET ORAL
Qty: 30 TABLET | Refills: 0 | Status: SHIPPED | OUTPATIENT
Start: 2018-06-27 | End: 2018-07-25 | Stop reason: SDUPTHER

## 2018-07-25 ENCOUNTER — OFFICE VISIT (OUTPATIENT)
Dept: CARDIOLOGY CLINIC | Age: 73
End: 2018-07-25

## 2018-07-25 VITALS
OXYGEN SATURATION: 94 % | HEIGHT: 73 IN | SYSTOLIC BLOOD PRESSURE: 138 MMHG | BODY MASS INDEX: 35.92 KG/M2 | DIASTOLIC BLOOD PRESSURE: 80 MMHG | WEIGHT: 271 LBS | HEART RATE: 68 BPM

## 2018-07-25 DIAGNOSIS — E66.09 CLASS 1 OBESITY DUE TO EXCESS CALORIES WITH SERIOUS COMORBIDITY IN ADULT, UNSPECIFIED BMI: ICD-10-CM

## 2018-07-25 PROCEDURE — 99214 OFFICE O/P EST MOD 30 MIN: CPT | Performed by: INTERNAL MEDICINE

## 2018-07-25 RX ORDER — PHENTERMINE HYDROCHLORIDE 37.5 MG/1
37.5 TABLET ORAL
Qty: 30 TABLET | Refills: 0 | Status: SHIPPED | OUTPATIENT
Start: 2018-07-25 | End: 2018-08-22 | Stop reason: SDUPTHER

## 2018-07-25 NOTE — PROGRESS NOTES
Early Death Brother 64        \"Pulmonary Embolism\"    Asthma Brother     Other Brother         \"Complications From Being Shot Up In [place]"     Social History   Substance Use Topics    Smoking status: Former Smoker     Packs/day: 2.00     Years: 9.00     Start date: 9/17/1966     Quit date: 1/16/1975    Smokeless tobacco: Never Used    Alcohol use No      [unfilled]  Review of Systems:   · Constitutional: No Fever or Weight Loss   · Eyes: No Decreased Vision  · ENT: No Headaches, Hearing Loss or Vertigo  · Cardiovascular: No chest pain, dyspnea on exertion, palpitations or loss of consciousness  · Respiratory: No cough or wheezing    · Gastrointestinal: No abdominal pain, appetite loss, blood in stools, constipation, diarrhea or heartburn  · Genitourinary: No dysuria, trouble voiding, or hematuria  · Musculoskeletal:  No gait disturbance, weakness or joint complaints  · Integumentary: No rash or pruritis  · Neurological: No TIA or stroke symptoms  · Psychiatric: No anxiety or depression  · Endocrine: No malaise, fatigue or temperature intolerance  · Hematologic/Lymphatic: No bleeding problems, blood clots or swollen lymph nodes  · Allergic/Immunologic: No nasal congestion or hives  All systems negative except as marked. Objective:  /80 (Position: Sitting, Cuff Size: Large Adult)   Pulse 68   Ht 6' 1\" (1.854 m)   Wt 271 lb (122.9 kg)   SpO2 94%   BMI 35.75 kg/m²   Wt Readings from Last 3 Encounters:   07/25/18 271 lb (122.9 kg)   06/27/18 289 lb (131.1 kg)   04/26/18 277 lb (125.6 kg)     Body mass index is 35.75 kg/m². GENERAL - Alert, oriented, pleasant, in no apparent distress,normal grooming  HEENT  pupils are reactive to light and accomodation, cornea intact, conjunctive normal color, ears are normal in exam,throat exam in normal, teeth, gum and palate are normal, oral mucosa is normal without any notation of pallor or cyanosis  Neck - Supple. No jugular venous distention noted.  No carotid

## 2018-08-22 ENCOUNTER — OFFICE VISIT (OUTPATIENT)
Dept: CARDIOLOGY CLINIC | Age: 73
End: 2018-08-22

## 2018-08-22 VITALS
BODY MASS INDEX: 34.25 KG/M2 | SYSTOLIC BLOOD PRESSURE: 122 MMHG | HEIGHT: 73 IN | HEART RATE: 70 BPM | DIASTOLIC BLOOD PRESSURE: 64 MMHG | WEIGHT: 258.4 LBS

## 2018-08-22 DIAGNOSIS — E66.09 CLASS 1 OBESITY DUE TO EXCESS CALORIES WITH SERIOUS COMORBIDITY IN ADULT, UNSPECIFIED BMI: ICD-10-CM

## 2018-08-22 PROCEDURE — 99214 OFFICE O/P EST MOD 30 MIN: CPT | Performed by: INTERNAL MEDICINE

## 2018-08-22 RX ORDER — PHENTERMINE HYDROCHLORIDE 37.5 MG/1
37.5 TABLET ORAL
Qty: 30 TABLET | Refills: 0 | Status: SHIPPED | OUTPATIENT
Start: 2018-08-22 | End: 2018-09-21

## 2018-08-22 NOTE — PROGRESS NOTES
wheezing, No chest tenderness. ,no use of accessory muscles, diaphragm movement is normal  Cardiovascular: (PMI) apex non displaced,no lifts no thrills, no s3,no s4, Normal heart rate, Normal rhythm, No murmurs, No rubs, No gallops. Carotid arteries pulse and amplitude are normal no bruit, no abdominal bruit noted ( normal abdominal aorta ausculation), femoral arteries pulse and amplitude are normal no bruit, pedal pulses are normal  Femoral pulses have normal amplitude, no bruits   Extremities - No cyanosis, clubbing, or significant edema, no varicose veins    Abdomen  No masses, tenderness, or organomegaly, no hepato-splenomegally, no bruits  Musculoskeletal  No significant edema, no kyphosis or scoliosis, no deformity in any extremity noted, muscle strength and tone are normal  Skin: no ulcer,no scar,no stasis dermatitis   Neurologic  alert oriented times 3,Cranial nerves II through XII are grossly intact. There were no gross focal neurologic abnormalities. All sensory and motor nerves examined and were normal  Psychiatric: normal mood and affect    No results found for: CKTOTAL, CKMB, CKMBINDEX, TROPONINI  BNP:  No results found for: BNP  PT/INR:  No results found for: PTINR  Lab Results   Component Value Date    LABA1C 5.8 02/19/2018     No results found for: CHOL, TRIG, HDL, LDLCALC, LDLDIRECT  Lab Results   Component Value Date    ALT 25 02/18/2018    AST 23 02/18/2018     TSH:  No results found for: TSH    Impression:  Cecille Marcial is a 72 y. o.year old who  has a past medical history of Anemia; Back problem; Diabetes mellitus ([facility] Fall; H/O echocardiogram; History of nuclear stress test; [place] (hard of hearing); HX OTHER MEDICAL; Hyperlipidemia; Hypertension; Leg swelling; OCD (obsessive compulsive disorder); Osteomyelitis ([facility] Pre-diabetes; Seasonal allergies; Shortness of breath on exertion; and Wears glasses. and presents with     Plan: lost 31 lbs in 2 months    1. Obeisty: refill adipex  2.  Pre diabetes:

## 2018-09-19 ENCOUNTER — OFFICE VISIT (OUTPATIENT)
Dept: CARDIOLOGY CLINIC | Age: 73
End: 2018-09-19

## 2018-09-19 VITALS
DIASTOLIC BLOOD PRESSURE: 80 MMHG | RESPIRATION RATE: 16 BRPM | HEART RATE: 60 BPM | HEIGHT: 73 IN | BODY MASS INDEX: 33 KG/M2 | SYSTOLIC BLOOD PRESSURE: 120 MMHG | WEIGHT: 249 LBS

## 2018-09-19 DIAGNOSIS — E78.5 DYSLIPIDEMIA: Primary | ICD-10-CM

## 2018-09-19 DIAGNOSIS — I10 ESSENTIAL HYPERTENSION: ICD-10-CM

## 2018-09-19 DIAGNOSIS — E66.01 CLASS 2 SEVERE OBESITY DUE TO EXCESS CALORIES WITH SERIOUS COMORBIDITY AND BODY MASS INDEX (BMI) OF 38.0 TO 38.9 IN ADULT (HCC): ICD-10-CM

## 2018-09-19 PROCEDURE — 99214 OFFICE O/P EST MOD 30 MIN: CPT | Performed by: INTERNAL MEDICINE

## 2018-09-19 NOTE — PROGRESS NOTES
Allergies: Other; Unable to assess; Vicodin [hydrocodone-acetaminophen]; Codeine; and Tape Jeffrie Salter tape]  Past Medical History:   Diagnosis Date    Anemia 1982    \"While In The Hospital\"    Back problem     \"Back Goes Out Sometimes, Have Couple Herniated Discs That Cause My Feet To Go Numb\"    Diabetes mellitus ([facility]     \"Borderline\"    Fall 1982    \"Fell Off Ladder\" -- Lt ankle and Rt heel fractures    H/O echocardiogram 10/18/2017    EF 55-60. Grade I diastolic dysfunction. Mild concentric left ventricular hypertrophy. The left atrium is mildly dilated. No significant valvulopathy seen.  History of nuclear stress test 10/18/2017    EF 60%. Normal study.     [place] (hard of hearing)     \"Both Ears\"    HX OTHER MEDICAL     Primary Care Physician Is [facility] Basim Alanzi, [facility]    Hyperlipidemia     Hypertension     Leg swelling     Rt leg greater than Lt--reduces somewhat overnight    OCD (obsessive compulsive disorder)     Osteomyelitis ([facility]     tip of Rt 2nd toe--was amputated in 2013    Pre-diabetes     Seasonal allergies     hayfever    Shortness of breath on exertion     Wears glasses      Past Surgical History:   Procedure Laterality Date    AMPUTATION  09/20/2013    Right 2nd toe--distal tip     COLONOSCOPY  2000's    FRACTURE SURGERY Left 1982    Broken Left Ankle, Fell Off A Ladder    FRACTURE SURGERY Right 1982    \"Crushed Heel Right Foot, Fell Off A Ladder\"    VENTRAL HERNIA REPAIR  10/24/2017     Family History   Problem Relation Age of Onset    Asthma Mother     Cancer Mother         \"Skin Cancer\"   [place] Hearing Loss Mother     Heart Disease Mother         \"Pacemaker\"    Cancer Father         \"Lymphoma\"    Other Father         \"Back Problems\"    Early Death Brother 64        \"Pulmonary Embolism\"    Asthma Brother     Other Brother         \"Complications From Being Shot Up In [place]"     Social History   Substance Use Topics    Smoking status: Former Smoker

## 2018-12-12 ENCOUNTER — OFFICE VISIT (OUTPATIENT)
Dept: CARDIOLOGY CLINIC | Age: 73
End: 2018-12-12
Payer: MEDICARE

## 2018-12-12 VITALS
WEIGHT: 225 LBS | HEIGHT: 73 IN | SYSTOLIC BLOOD PRESSURE: 96 MMHG | HEART RATE: 52 BPM | DIASTOLIC BLOOD PRESSURE: 56 MMHG | BODY MASS INDEX: 29.82 KG/M2 | RESPIRATION RATE: 16 BRPM

## 2018-12-12 DIAGNOSIS — I10 ESSENTIAL HYPERTENSION: Primary | ICD-10-CM

## 2018-12-12 PROCEDURE — 99214 OFFICE O/P EST MOD 30 MIN: CPT | Performed by: INTERNAL MEDICINE

## 2018-12-12 RX ORDER — INFLUENZA A VIRUS A/MICHIGAN/45/2015 X-275 (H1N1) ANTIGEN (FORMALDEHYDE INACTIVATED), INFLUENZA A VIRUS A/SINGAPORE/INFIMH-16-0019/2016 IVR-186 (H3N2) ANTIGEN (FORMALDEHYDE INACTIVATED), AND INFLUENZA B VIRUS B/MARYLAND/15/2016 BX-69A (A B/COLORADO/6/2017-LIKE VIRUS) ANTIGEN (FORMALDEHYDE INACTIVATED) 60; 60; 60 UG/.5ML; UG/.5ML; UG/.5ML
INJECTION, SUSPENSION INTRAMUSCULAR
Refills: 0 | COMMUNITY
Start: 2018-10-25 | End: 2019-02-20 | Stop reason: ALTCHOICE

## 2018-12-12 RX ORDER — LOSARTAN POTASSIUM 50 MG/1
50 TABLET ORAL DAILY
Qty: 30 TABLET | Refills: 3 | Status: SHIPPED | OUTPATIENT
Start: 2018-12-12

## 2019-01-09 ENCOUNTER — OFFICE VISIT (OUTPATIENT)
Dept: CARDIOLOGY CLINIC | Age: 74
End: 2019-01-09
Payer: MEDICARE

## 2019-01-09 VITALS
HEART RATE: 56 BPM | RESPIRATION RATE: 14 BRPM | BODY MASS INDEX: 28.76 KG/M2 | WEIGHT: 217 LBS | HEIGHT: 73 IN | SYSTOLIC BLOOD PRESSURE: 110 MMHG | DIASTOLIC BLOOD PRESSURE: 68 MMHG

## 2019-01-09 DIAGNOSIS — I10 ESSENTIAL HYPERTENSION: Primary | ICD-10-CM

## 2019-01-09 DIAGNOSIS — E78.5 DYSLIPIDEMIA: ICD-10-CM

## 2019-01-09 PROCEDURE — 99214 OFFICE O/P EST MOD 30 MIN: CPT | Performed by: INTERNAL MEDICINE

## 2019-02-20 ENCOUNTER — OFFICE VISIT (OUTPATIENT)
Dept: CARDIOLOGY CLINIC | Age: 74
End: 2019-02-20
Payer: MEDICARE

## 2019-02-20 VITALS
RESPIRATION RATE: 16 BRPM | WEIGHT: 209 LBS | SYSTOLIC BLOOD PRESSURE: 118 MMHG | BODY MASS INDEX: 27.7 KG/M2 | DIASTOLIC BLOOD PRESSURE: 74 MMHG | HEIGHT: 73 IN | HEART RATE: 56 BPM

## 2019-02-20 DIAGNOSIS — I10 ESSENTIAL HYPERTENSION: Primary | ICD-10-CM

## 2019-02-20 DIAGNOSIS — M79.89 LEG SWELLING: ICD-10-CM

## 2019-02-20 PROCEDURE — 99214 OFFICE O/P EST MOD 30 MIN: CPT | Performed by: INTERNAL MEDICINE

## 2019-02-26 ENCOUNTER — PROCEDURE VISIT (OUTPATIENT)
Dept: CARDIOLOGY CLINIC | Age: 74
End: 2019-02-26
Payer: MEDICARE

## 2019-02-26 DIAGNOSIS — I10 ESSENTIAL HYPERTENSION: ICD-10-CM

## 2019-02-26 DIAGNOSIS — M79.89 LEG SWELLING: Primary | ICD-10-CM

## 2019-02-26 PROCEDURE — 93970 EXTREMITY STUDY: CPT | Performed by: INTERNAL MEDICINE

## 2019-02-28 ENCOUNTER — TELEPHONE (OUTPATIENT)
Dept: CARDIOLOGY CLINIC | Age: 74
End: 2019-02-28

## 2019-04-24 ENCOUNTER — OFFICE VISIT (OUTPATIENT)
Dept: CARDIOLOGY CLINIC | Age: 74
End: 2019-04-24
Payer: MEDICARE

## 2019-04-24 VITALS
DIASTOLIC BLOOD PRESSURE: 80 MMHG | HEIGHT: 73 IN | HEART RATE: 56 BPM | BODY MASS INDEX: 27.3 KG/M2 | WEIGHT: 206 LBS | SYSTOLIC BLOOD PRESSURE: 130 MMHG | RESPIRATION RATE: 16 BRPM

## 2019-04-24 DIAGNOSIS — M79.89 LEG SWELLING: Primary | ICD-10-CM

## 2019-04-24 PROCEDURE — 99214 OFFICE O/P EST MOD 30 MIN: CPT | Performed by: INTERNAL MEDICINE

## 2019-04-24 RX ORDER — FERROUS SULFATE 325(65) MG
325 TABLET ORAL
COMMUNITY

## 2019-04-24 NOTE — PROGRESS NOTES
Tiffany Parsons MD        OFFICE  FOLLOWUP NOTE    Chief complaints: patient is here for management of leg swelling, HTN, dyslipidemia and obesity      Subjective: patient feels better, no chest pain, no shortness of breath, no dizziness, no palpitations    HPI Cory Daniel is a 69 y. o.year old who  has a past medical history of Anemia, Back problem, Diabetes mellitus ([facility] Fall, H/O Doppler ultrasound, H/O echocardiogram, History of nuclear stress test, [place] (hard of hearing), HX OTHER MEDICAL, Hyperlipidemia, Hypertension, Leg swelling, OCD (obsessive compulsive disorder), Osteomyelitis ([facility] Pre-diabetes, Seasonal allergies, Shortness of breath on exertion, and Wears glasses. and presents for management of leg swelling, HTN, dyslipidemia and obesity   which are well controlled, he lost 3 more lbs, using compression socks,   His HCTZ was stopped during last visit ( before that clonidine was stopped and losartan was decreased), he feels better, blood pressure is stable,. He had venous doppler which showed   Significant reflux noted of the Right mid femoral vein.    Significant reflux noted in the Left Popliteal vein. Current Outpatient Medications   Medication Sig Dispense Refill    ferrous sulfate 325 (65 Fe) MG tablet Take 325 mg by mouth daily (with breakfast)      losartan (COZAAR) 50 MG tablet Take 1 tablet by mouth daily 30 tablet 3    Omega-3 Fatty Acids (EQL OMEGA 3 FISH OIL) 1400 MG CAPS Take 1 capsule by mouth daily      Compression Stockings MISC by Does not apply route 1 each 0    atorvastatin (LIPITOR) 10 MG tablet TAKE 1 TABLET BY MOUTH AT BEDTIME  3    furosemide (LASIX) 40 MG tablet TAKE 1 TABLET(S) BY MOUTH DAILY   pt takes in evening  2    meloxicam (MOBIC) 15 MG tablet Take 15 mg by mouth daily as needed       metoprolol (TOPROL XL) 50 MG XL tablet Take 25 mg by mouth daily       Multiple Vitamins-Minerals (MULTIVITAMIN PO) Take  by mouth every morning.  Over The Counter      aspirin 81 MG tablet Take 81 mg by mouth nightly Over The Counter       No current facility-administered medications for this visit. Allergies: Other; Unable to assess; Vicodin [hydrocodone-acetaminophen]; Codeine; and Tape [adhesive tape]  Past Medical History:   Diagnosis Date    Anemia 1982    \"While In The Hospital\"    Back problem     \"Back Goes Out Sometimes, Have Couple Herniated Discs That Cause My Feet To Go Numb\"    Diabetes mellitus ([facility]     \"Borderline\"    Fall 1982    \"Fell Off Ladder\" -- Lt ankle and Rt heel fractures    H/O Doppler ultrasound 02/26/2019    No evidence of DVT or SVT in the bilateral common femoral vein, femoral  vein, popliteal vein, greater saphenous vein or small saphenous vein. Significant reflux noted of the Right mid femoral vein. Significant reflux noted in the Left Popliteal vein.  H/O echocardiogram 10/18/2017    EF 55-60. Grade I diastolic dysfunction. Mild concentric left ventricular hypertrophy. The left atrium is mildly dilated. No significant valvulopathy seen.  History of nuclear stress test 10/18/2017    EF 60%. Normal study.     [place] (hard of hearing)     \"Both Ears\"    HX OTHER MEDICAL     Primary Care Physician Is [facility] Hyperlipidemia     Hypertension     Leg swelling     Rt leg greater than Lt--reduces somewhat overnight    OCD (obsessive compulsive disorder)     Osteomyelitis ([facility]     tip of Rt 2nd toe--was amputated in 2013    Pre-diabetes     Seasonal allergies     hayfever    Shortness of breath on exertion     Wears glasses      Past Surgical History:   Procedure Laterality Date    AMPUTATION  09/20/2013    Right 2nd toe--distal tip     COLONOSCOPY  2000's    FRACTURE SURGERY Left 1982    Broken Left Ankle, Fell Off A Ladder    FRACTURE SURGERY Right 1982    \"Crushed Heel Right Foot, Fell Off A Ladder\"    VENTRAL HERNIA REPAIR  10/24/2017     Family History   Problem Relation Age of Onset  Asthma Mother     Cancer Mother         \"Skin Cancer\"   Brule Piles Hearing Loss Mother     Heart Disease Mother         \"Pacemaker\"    Cancer Father         \"Lymphoma\"    Other Father         \"Back Problems\"    Early Death Brother 64        \"Pulmonary Embolism\"   Brule Piles Asthma Brother     Other Brother         \"Complications From Being Shot Up In [place]"     Social History     Tobacco Use    Smoking status: Former Smoker     Packs/day: 2.00     Years: 9.00     Pack years: 18.00     Start date: 1966     Last attempt to quit: 1975     Years since quittin.2    Smokeless tobacco: Never Used   Substance Use Topics    Alcohol use: No      [unfilled]  Review of Systems:   · Constitutional: No Fever or Weight Loss   · Eyes: No Decreased Vision  · ENT: No Headaches, Hearing Loss or Vertigo  · Cardiovascular: No chest pain, dyspnea on exertion, palpitations or loss of consciousness  · Respiratory: No cough or wheezing    · Gastrointestinal: No abdominal pain, appetite loss, blood in stools, constipation, diarrhea or heartburn  · Genitourinary: No dysuria, trouble voiding, or hematuria  · Musculoskeletal:  No gait disturbance, weakness or joint complaints  · Integumentary: No rash or pruritis  · Neurological: No TIA or stroke symptoms  · Psychiatric: No anxiety or depression  · Endocrine: No malaise, fatigue or temperature intolerance  · Hematologic/Lymphatic: No bleeding problems, blood clots or swollen lymph nodes  · Allergic/Immunologic: No nasal congestion or hives  All systems negative except as marked. Objective:  /80   Pulse 56   Resp 16   Ht 6' 1\" (1.854 m)   Wt 206 lb (93.4 kg)   BMI 27.18 kg/m²   Wt Readings from Last 3 Encounters:   19 206 lb (93.4 kg)   19 209 lb (94.8 kg)   19 217 lb (98.4 kg)     Body mass index is 27.18 kg/m².   GENERAL - Alert, oriented, pleasant, in no apparent distress,normal grooming  HEENT - pupils are reactive to light and accomodation, cornea History of nuclear stress test, [place] (hard of hearing), HX OTHER MEDICAL, Hyperlipidemia, Hypertension, Leg swelling, OCD (obsessive compulsive disorder), Osteomyelitis ([address] Pre-diabetes, Seasonal allergies, Shortness of breath on exertion, and Wears glasses. and presents with     Plan:  1. Obeisty: CONTINUE weight watchers  2. Pre diabetes: recommend to lose weight  3. HTN: stable, off hctz and clonidine, there was not recall on his losartan batch, will continue it   4. continue lopressor  5. Leg swelling: continue compression socks, venous doppler reviewed, no need for venus ablation,  6. Dyslipidemia: stable continue statin      All labs, medications and tests reviewed, continue all other medications of all above medical condition listed as is.     [unfilled]

## 2019-10-30 ENCOUNTER — OFFICE VISIT (OUTPATIENT)
Dept: CARDIOLOGY CLINIC | Age: 74
End: 2019-10-30
Payer: MEDICARE

## 2019-10-30 VITALS
DIASTOLIC BLOOD PRESSURE: 60 MMHG | BODY MASS INDEX: 25.71 KG/M2 | HEART RATE: 64 BPM | HEIGHT: 73 IN | SYSTOLIC BLOOD PRESSURE: 100 MMHG | WEIGHT: 194 LBS | RESPIRATION RATE: 16 BRPM

## 2019-10-30 DIAGNOSIS — I10 ESSENTIAL HYPERTENSION: Primary | ICD-10-CM

## 2019-10-30 PROCEDURE — 99214 OFFICE O/P EST MOD 30 MIN: CPT | Performed by: INTERNAL MEDICINE

## 2020-10-28 ENCOUNTER — OFFICE VISIT (OUTPATIENT)
Dept: CARDIOLOGY CLINIC | Age: 75
End: 2020-10-28
Payer: MEDICARE

## 2020-10-28 VITALS
BODY MASS INDEX: 26.11 KG/M2 | DIASTOLIC BLOOD PRESSURE: 72 MMHG | SYSTOLIC BLOOD PRESSURE: 124 MMHG | HEART RATE: 60 BPM | HEIGHT: 73 IN | WEIGHT: 197 LBS

## 2020-10-28 PROCEDURE — 99213 OFFICE O/P EST LOW 20 MIN: CPT | Performed by: INTERNAL MEDICINE

## 2020-10-28 NOTE — PROGRESS NOTES
Kar Rodriguez MD        OFFICE  FOLLOWUP NOTE    Chief complaints: patient is here for management of  leg swelling, HTN, dyslipidemia and obesity    Subjective: patient feels better, no chest pain, no shortness of breath, no dizziness, no palpitations    HPI Leonela Crain is a 70 y. o.year old who  has a past medical history of Anemia, Back problem, Diabetes mellitus ([facility] [address] Fall, H/O Doppler ultrasound, H/O echocardiogram, History of nuclear stress test, [place] [place] (hard of hearing), HX OTHER MEDICAL, Hyperlipidemia, Hypertension, Leg swelling, OCD (obsessive compulsive disorder), Osteomyelitis ([facility] [address] Pre-diabetes, Seasonal allergies, Shortness of breath on exertion, and Wears glasses. and presents for management of  leg swelling, HTN, dyslipidemia and obesity which are well controlled      Current Outpatient Medications   Medication Sig Dispense Refill    ferrous sulfate 325 (65 Fe) MG tablet Take 325 mg by mouth daily (with breakfast)      losartan (COZAAR) 50 MG tablet Take 1 tablet by mouth daily 30 tablet 3    Omega-3 Fatty Acids (EQL OMEGA 3 FISH OIL) 1400 MG CAPS Take 1 capsule by mouth daily      Compression Stockings MISC by Does not apply route 1 each 0    atorvastatin (LIPITOR) 10 MG tablet TAKE 1 TABLET BY MOUTH AT BEDTIME  3    furosemide (LASIX) 40 MG tablet TAKE 1 TABLET(S) BY MOUTH DAILY   pt takes in evening  2    meloxicam (MOBIC) 15 MG tablet Take 15 mg by mouth daily as needed       metoprolol (TOPROL XL) 50 MG XL tablet Take 25 mg by mouth daily       Multiple Vitamins-Minerals (MULTIVITAMIN PO) Take  by mouth every morning. Over The Counter      aspirin 81 MG tablet Take 81 mg by mouth nightly Over The Counter       No current facility-administered medications for this visit. Allergies: Other; Unable to assess; Vicodin [hydrocodone-acetaminophen];  Codeine; and Tape [adhesive tape]  Past Medical History:   Diagnosis Date    Anemia 1982    \"While In The Hospital\"    Back problem     \"Back Goes Out Sometimes, Have Couple Herniated Discs That Cause My Feet To Go Numb\"    Diabetes mellitus ([facility]     \"Borderline\"    Fall 1982    \"Fell Off Ladder\" -- Lt ankle and Rt heel fractures    H/O Doppler ultrasound 02/26/2019    No evidence of DVT or SVT in the bilateral common femoral vein, femoral  vein, popliteal vein, greater saphenous vein or small saphenous vein. Significant reflux noted of the Right mid femoral vein. Significant reflux noted in the Left Popliteal vein.  H/O echocardiogram 10/18/2017    EF 55-60. Grade I diastolic dysfunction. Mild concentric left ventricular hypertrophy. The left atrium is mildly dilated. No significant valvulopathy seen.  History of nuclear stress test 10/18/2017    EF 60%. Normal study.     [place] (hard of hearing)     \"Both Ears\"    HX OTHER MEDICAL     Primary Care Phys[facility] Is [facility] Nedra [facility], [facility]    Hyperlipidemia     Hypertension     Leg swelling     Rt leg greater than Lt--reduces somewhat overnight    OCD (obsessive compulsive disorder)     Osteomyelitis ([facility] [address]     tip of Rt 2nd toe--was amputated in 2013    Pre-diabetes     Seasonal allergies     hayfever    Shortness of breath on exertion     Wears glasses      Past Surgical History:   Procedure Laterality Date    AMPUTATION  09/20/2013    Right 2nd toe--distal tip     COLONOSCOPY  2000's    FRACTURE SURGERY Left 1982    Broken Left Ankle, Fell Off A Ladder    FRACTURE SURGERY Right 1982    \"Crushed Heel Right Foot, Fell Off A Ladder\"    VENTRAL HERNIA REPAIR  10/24/2017     Family History   Problem Relation Age of Onset    Asthma Mother     Cancer Mother         \"Skin Cancer\"   Abdulkadir Rudder Hearing Loss Mother     Heart Disease Mother         \"Pacemaker\"    Cancer Father         \"Lymphoma\"    Other Father         \"Back Problems\"    Early Death Brother 64        \"Pulmonary Embolism\"    Asthma Brother     Other Brother         \"Complications From Being Shot Up In [place]" Social History     Tobacco Use    Smoking status: Former Smoker     Packs/day: 2.00     Years: 9.00     Pack years: 18.00     Start date: 1966     Last attempt to quit: 1975     Years since quittin.8    Smokeless tobacco: Never Used   Substance Use Topics    Alcohol use: No      [unfilled]  Review of Systems:   · Constitutional: No Fever or Weight Loss   · Eyes: No Decreased Vision  · ENT: No Headaches, Hearing Loss or Vertigo  · Cardiovascular: No chest pain, dyspnea on exertion, palpitations or loss of consciousness  · Respiratory: No cough or wheezing    · Gastrointestinal: No abdominal pain, appetite loss, blood in stools, constipation, diarrhea or heartburn  · Genitourinary: No dysuria, trouble voiding, or hematuria  · Musculoskeletal:  No gait disturbance, weakness or joint complaints  · Integumentary: No rash or pruritis  · Neurological: No TIA or stroke symptoms  · Psychiatric: No anxiety or depression  · Endocrine: No malaise, fatigue or temperature intolerance  · Hematologic/Lymphatic: No bleeding problems, blood clots or swollen lymph nodes  · Allergic/Immunologic: No nasal congestion or hives  All systems negative except as marked. Objective:  /72   Pulse 60   Ht 6' 1\" (1.854 m)   Wt 197 lb (89.4 kg)   BMI 25.99 kg/m²   Wt Readings from Last 3 Encounters:   10/28/20 197 lb (89.4 kg)   10/30/19 194 lb (88 kg)   19 206 lb (93.4 kg)     Body mass index is 25.99 kg/m². GENERAL - Alert, oriented, pleasant, in no apparent distress,normal grooming  HEENT - pupils are reactive to light and accomodation, cornea intact, conjunctive normal color, ears are normal in exam,throat exam in normal, teeth, gum and palate are normal, oral mucosa is normal without any notation of pallor or cyanosis  Neck - Supple. No jugular venous distention noted.  No carotid bruits, no apical -carotid delay  Respiratory:  Normal breath sounds, No respiratory distress, No wheezing, No chest

## 2021-10-27 ENCOUNTER — OFFICE VISIT (OUTPATIENT)
Dept: CARDIOLOGY CLINIC | Age: 76
End: 2021-10-27
Payer: MEDICARE

## 2021-10-27 VITALS
HEART RATE: 60 BPM | SYSTOLIC BLOOD PRESSURE: 118 MMHG | BODY MASS INDEX: 25.98 KG/M2 | DIASTOLIC BLOOD PRESSURE: 64 MMHG | HEIGHT: 73 IN | WEIGHT: 196 LBS

## 2021-10-27 DIAGNOSIS — I10 HYPERTENSION, UNSPECIFIED TYPE: Primary | ICD-10-CM

## 2021-10-27 PROCEDURE — 93000 ELECTROCARDIOGRAM COMPLETE: CPT | Performed by: INTERNAL MEDICINE

## 2021-10-27 PROCEDURE — 99214 OFFICE O/P EST MOD 30 MIN: CPT | Performed by: INTERNAL MEDICINE

## 2021-10-27 NOTE — PROGRESS NOTES
Sarah Davalos MD        OFFICE  FOLLOWUP NOTE    Chief complaints: patient is here for management of leg swelling, HTN, dyslipidemia and obesity    Subjective: patient feels better, no chest pain, no shortness of breath, no dizziness, no palpitations    HPI Rachell Morris is a 78 y. o.year old who  has a past medical history of Anemia, Back problem, Diabetes mellitus ([facility] [address] Fall, H/O Doppler ultrasound, H/O echocardiogram, History of nuclear stress test, [place] (hard of hearing), HX OTHER MEDICAL, Hyperlipidemia, Hypertension, Leg swelling, OCD (obsessive compulsive disorder), Osteomyelitis ([facility] [address] Pre-diabetes, Seasonal allergies, Shortness of breath on exertion, and Wears glasses. and presents for management of leg swelling, HTN, dyslipidemia and obesity, which are well controlled    Patient's mom  and his wife has liver cancer    Current Outpatient Medications   Medication Sig Dispense Refill    ferrous sulfate 325 (65 Fe) MG tablet Take 325 mg by mouth daily (with breakfast)      losartan (COZAAR) 50 MG tablet Take 1 tablet by mouth daily 30 tablet 3    Omega-3 Fatty Acids (EQL OMEGA 3 FISH OIL) 1400 MG CAPS Take 1 capsule by mouth daily      Compression Stockings MISC by Does not apply route 1 each 0    atorvastatin (LIPITOR) 10 MG tablet TAKE 1 TABLET BY MOUTH AT BEDTIME  3    furosemide (LASIX) 40 MG tablet TAKE 1 TABLET(S) BY MOUTH DAILY   pt takes in evening  2    meloxicam (MOBIC) 15 MG tablet Take 15 mg by mouth daily as needed       metoprolol (TOPROL XL) 50 MG XL tablet Take 25 mg by mouth daily       Multiple Vitamins-Minerals (MULTIVITAMIN PO) Take  by mouth every morning. Over The Counter      aspirin 81 MG tablet Take 81 mg by mouth nightly Over The Counter       No current facility-administered medications for this visit. Allergies:  Other, Unable to assess, Vicodin [hydrocodone-acetaminophen], Codeine, and Tape [adhesive tape]  Past Medical History:   Diagnosis Date    Anemia 1982    \"While In The Hospital\"    Back problem     \"Back Goes Out Sometimes, Have Couple Herniated Discs That Cause My Feet To Go Numb\"    Diabetes mellitus ([facility] [address]     \"Borderline\"    Fall 1982    \"Fell Off Ladder\" -- Lt ankle and Rt heel fractures    H/O Doppler ultrasound 02/26/2019    No evidence of DVT or SVT in the bilateral common femoral vein, femoral  vein, popliteal vein, greater saphenous vein or small saphenous vein. Significant reflux noted of the Right mid femoral vein. Significant reflux noted in the Left Popliteal vein.  H/O echocardiogram 10/18/2017    EF 55-60. Grade I diastolic dysfunction. Mild concentric left ventricular hypertrophy. The left atrium is mildly dilated. No significant valvulopathy seen.  History of nuclear stress test 10/18/2017    EF 60%. Normal study.     [place] (hard of hearing)     \"Both Ears\"    HX OTHER MEDICAL     Primary Care Physician Is Dr. Ara Dent, [facility]    Hyperlipidemia     Hypertension     Leg swelling     Rt leg greater than Lt--reduces somewhat overnight    OCD (obsessive compulsive disorder)     Osteomyelitis ([facility] [address]     tip of Rt 2nd toe--was amputated in 2013    Pre-diabetes     Seasonal allergies     hayfever    Shortness of breath on exertion     Wears glasses      Past Surgical History:   Procedure Laterality Date    AMPUTATION  09/20/2013    Right 2nd toe--distal tip     COLONOSCOPY  2000's    FRACTURE SURGERY Left 1982    Broken Left Ankle, Fell Off A Ladder    FRACTURE SURGERY Right 1982    \"Crushed Heel Right Foot, Fell Off A Ladder\"    VENTRAL HERNIA REPAIR  10/24/2017     Family History   Problem Relation Age of Onset    Asthma Mother     Cancer Mother         \"Skin Cancer\"    Hearing Loss Mother     Heart Disease Mother         \"Pacemaker\"    Cancer Father         \"Lymphoma\"    Other Father         \"Back Problems\"    Early Death Brother 64        \"Pulmonary Embolism\"   [facility] Ast[facility] Brother     Other Brother \"Complications From Being Shot Up In [place]"     Social History     Tobacco Use    Smoking status: Former Smoker     Packs/day: 2.00     Years: 9.00     Pack years: 18.00     Start date: 1966     Quit date: 1975     Years since quittin.8    Smokeless tobacco: Never Used   Substance Use Topics    Alcohol use: No      [unfilled]  Review of Systems:   · Constitutional: No Fever or Weight Loss   · Eyes: No Decreased Vision  · ENT: No Headaches, Hearing Loss or Vertigo  · Cardiovascular: No chest pain, dyspnea on exertion, palpitations or loss of consciousness  · Respiratory: No cough or wheezing    · Gastrointestinal: No abdominal pain, appetite loss, blood in stools, constipation, diarrhea or heartburn  · Genitourinary: No dysuria, trouble voiding, or hematuria  · Musculoskeletal:  No gait disturbance, weakness or joint complaints  · Integumentary: No rash or pruritis  · Neurological: No TIA or stroke symptoms  · Psychiatric: No anxiety or depression  · Endocrine: No malaise, fatigue or temperature intolerance  · Hematologic/Lymphatic: No bleeding problems, blood clots or swollen lymph nodes  · Allergic/Immunologic: No nasal congestion or hives  All systems negative except as marked. Objective:  /64   Pulse 60   Ht 6' 1\" (1.854 m)   Wt 196 lb (88.9 kg)   BMI 25.86 kg/m²   Wt Readings from Last 3 Encounters:   10/27/21 196 lb (88.9 kg)   10/28/20 197 lb (89.4 kg)   10/30/19 194 lb (88 kg)     Body mass index is 25.86 kg/m². GENERAL - Alert, oriented, pleasant, in no apparent distress,normal grooming  HEENT  pupils are intact, cornea intact, conjunctive normal color, ears are normal in exam,  Neck - Supple. No jugular venous distention noted. No carotid bruits, no apical -carotid delay  Respiratory:  Normal breath sounds, No respiratory distress, No wheezing, No chest tenderness. ,no use of accessory muscles, diaphragm movement is normal  Cardiovascular: (PMI) apex non displaced,no lifts ablation,  5. Dyslipidemia: stable continue statin  6. All labs, medications and tests reviewed, continue all other medications of all above medical condition listed as is.

## 2022-11-30 ENCOUNTER — OFFICE VISIT (OUTPATIENT)
Dept: CARDIOLOGY CLINIC | Age: 77
End: 2022-11-30
Payer: MEDICARE

## 2022-11-30 VITALS
DIASTOLIC BLOOD PRESSURE: 76 MMHG | WEIGHT: 232 LBS | HEART RATE: 70 BPM | HEIGHT: 73 IN | SYSTOLIC BLOOD PRESSURE: 128 MMHG | BODY MASS INDEX: 30.75 KG/M2

## 2022-11-30 DIAGNOSIS — I10 HYPERTENSION, UNSPECIFIED TYPE: Primary | ICD-10-CM

## 2022-11-30 PROCEDURE — 3074F SYST BP LT 130 MM HG: CPT | Performed by: INTERNAL MEDICINE

## 2022-11-30 PROCEDURE — 99214 OFFICE O/P EST MOD 30 MIN: CPT | Performed by: INTERNAL MEDICINE

## 2022-11-30 PROCEDURE — 1123F ACP DISCUSS/DSCN MKR DOCD: CPT | Performed by: INTERNAL MEDICINE

## 2022-11-30 PROCEDURE — 3078F DIAST BP <80 MM HG: CPT | Performed by: INTERNAL MEDICINE

## 2022-11-30 NOTE — PROGRESS NOTES
Rob Rice MD        OFFICE  FOLLOWUP NOTE    Chief complaints: patient is here for management of leg swelling, HTN, dyslipidemia and obesitym CVI  Subjective: patient feels better, no chest pain, no shortness of breath, no dizziness, no palpitations    HPI Melinda Hadley is a 78 y. o.year old who  has a past medical history of Anemia, Back problem, Diabetes mellitus ([facility] [address] Fall, H/O Doppler ultrasound, H/O echocardiogram, History of nuclear stress test, [place] (hard of hearing), HX OTHER MEDICAL, Hyperlipidemia, Hypertension, Leg swelling, OCD (obsessive compulsive disorder), Osteomyelitis ([address] Pre-diabetes, Seasonal allergies, Shortness of breath on exertion, and Wears glasses. and presents for management of leg swelling, HTN, dyslipidemia and obesity, which are well controlled      Current Outpatient Medications   Medication Sig Dispense Refill    ferrous sulfate 325 (65 Fe) MG tablet Take 325 mg by mouth daily (with breakfast)      losartan (COZAAR) 50 MG tablet Take 1 tablet by mouth daily 30 tablet 3    Omega-3 Fatty Acids (EQL OMEGA 3 FISH OIL) 1400 MG CAPS Take 1 capsule by mouth daily      Compression Stockings MISC by Does not apply route 1 each 0    atorvastatin (LIPITOR) 10 MG tablet TAKE 1 TABLET BY MOUTH AT BEDTIME  3    furosemide (LASIX) 40 MG tablet TAKE 1 TABLET(S) BY MOUTH DAILY   pt takes in evening  2    meloxicam (MOBIC) 15 MG tablet Take 15 mg by mouth daily as needed       metoprolol succinate (TOPROL XL) 50 MG extended release tablet Take 25 mg by mouth daily       Multiple Vitamins-Minerals (MULTIVITAMIN PO) Take  by mouth every morning. Over The Counter      aspirin 81 MG tablet Take 81 mg by mouth nightly Over The Counter       No current facility-administered medications for this visit. Allergies:  Other, Unable to assess, Vicodin [hydrocodone-acetaminophen], Codeine, and Tape [adhesive tape]  Past Medical History:   Diagnosis Date    Anemia 1982    \"While In The Hospital\" Back problem     \"Back Goes Out Sometimes, Have Couple Herniated Discs That Cause My Feet To Go Numb\"    Diabetes mellitus ([address]     \"Borderline\"    Fall 1982    \"Fell Off Ladder\" -- Lt ankle and Rt heel fractures    H/O Doppler ultrasound 02/26/2019    No evidence of DVT or SVT in the bilateral common femoral vein, femoral  vein, popliteal vein, greater saphenous vein or small saphenous vein. Significant reflux noted of the Right mid femoral vein. Significant reflux noted in the Left Popliteal vein. H/O echocardiogram 10/18/2017    EF 55-60. Grade I diastolic dysfunction. Mild concentric left ventricular hypertrophy. The left atrium is mildly dilated. No significant valvulopathy seen. History of nuclear stress test 10/18/2017    EF 60%. Normal study.     [place] (hard of hearing)     \"Both Ears\"    HX OTHER MEDICAL     Primary Care Physician Is Dr. Claudette Charles, [address] [place] [address]     Hyperlipidemia     Hypertension     Leg swelling     Rt leg greater than Lt--reduces somewhat overnight    OCD (obsessive compulsive disorder)     Osteomyelitis (HCC)     tip of Rt 2nd toe--was amputated in 2013    Pre-diabetes     Seasonal allergies     hayfever    Shortness of breath on exertion     Wears glasses      Past Surgical History:   Procedure Laterality Date    AMPUTATION  09/20/2013    Right 2nd toe--distal tip     COLONOSCOPY  2000's    FRACTURE SURGERY Left 1982    Broken Left Ankle, Fell Off A Ladder    FRACTURE SURGERY Right 1982    \"Crushed Heel Right Foot, Fell Off A Ladder\"    VENTRAL HERNIA REPAIR  10/24/2017     Family History   Problem Relation Age of Onset    Asthma Mother     Cancer Mother         \"Skin Cancer\"    Hearing Loss Mother     Heart Disease Mother         \"Pacemaker\"    Cancer Father         \"Lymphoma\"    Other Father         \"Back Problems\"    Early Death Brother 64        \"Pulmonary Embolism\"    Asthma Brother     Other Brother         \"Complications From Being Shot Up In [place]"     Social History Tobacco Use    Smoking status: Former     Packs/day: 2.00     Years: 9.00     Pack years: 18.00     Types: Cigarettes     Start date: 1966     Quit date: 1975     Years since quittin.9    Smokeless tobacco: Never   Substance Use Topics    Alcohol use: No      [unfilled]  Review of Systems:   Constitutional: No Fever or Weight Loss   Eyes: No Decreased Vision  ENT: No Headaches, Hearing Loss or Vertigo  Cardiovascular: No chest pain, dyspnea on exertion, palpitations or loss of consciousness  Respiratory: No cough or wheezing    Gastrointestinal: No abdominal pain, appetite loss, blood in stools, constipation, diarrhea or heartburn  Genitourinary: No dysuria, trouble voiding, or hematuria  Musculoskeletal:  No gait disturbance, weakness or joint complaints  Integumentary: No rash or pruritis  Neurological: No TIA or stroke symptoms  Psychiatric: No anxiety or depression  Endocrine: No malaise, fatigue or temperature intolerance  Hematologic/Lymphatic: No bleeding problems, blood clots or swollen lymph nodes  Allergic/Immunologic: No nasal congestion or hives  All systems negative except as marked. Objective:  /76   Pulse 70   Ht 6' 1\" (1.854 m)   Wt 232 lb (105.2 kg)   BMI 30.61 kg/m²   Wt Readings from Last 3 Encounters:   22 232 lb (105.2 kg)   10/27/21 196 lb (88.9 kg)   10/28/20 197 lb (89.4 kg)     Body mass index is 30.61 kg/m². GENERAL - Alert, oriented, pleasant, in no apparent distress,normal grooming  HEENT - pupils are intact, cornea intact, conjunctive normal color, ears are normal in exam,  Neck - Supple. No jugular venous distention noted. No carotid bruits, no apical -carotid delay  Respiratory:  Normal breath sounds, No respiratory distress, No wheezing, No chest tenderness. ,no use of accessory muscles, diaphragm movement is normal  Cardiovascular: (PMI) apex non displaced,no lifts no thrills, no s3,no s4, Normal heart rate, Normal rhythm, No murmurs, No rubs, No raegan. Carotid arteries pulse and amplitude are normal no bruit, no abdominal bruit noted ( normal abdominal aorta ausculation),   Extremities - No cyanosis, clubbing, or significant edema, no varicose veins    Abdomen - No masses, tenderness, or organomegaly, no hepato-splenomegally, no bruits  Musculoskeletal - No significant edema, no kyphosis or scoliosis, no deformity in any extremity noted, muscle strength and tone are normal  Skin: no ulcer,no scar,no stasis dermatitis   Neurologic - alert oriented times 3,Cranial nerves II through XII are grossly intact. There were no gross focal neurologic abnormalities. Psychiatric: normal mood and affect    No results found for: CKTOTAL, CKMB, CKMBINDEX, TROPONINI  BNP:  No results found for: BNP  PT/INR:  No results found for: PTINR  Lab Results   Component Value Date    LABA1C 5.8 02/19/2018     No results found for: CHOL, TRIG, HDL, LDLCALC, LDLDIRECT  Lab Results   Component Value Date    ALT 25 02/18/2018    AST 23 02/18/2018     TSH:  No results found for: TSH    Impression:  Sunil Lynn is a 78 y. o.year old who  has a past medical history of Anemia, Back problem, Diabetes mellitus ([address] Fall, H/O Doppler ultrasound, H/O echocardiogram, History of nuclear stress test, [place] (hard of hearing), HX OTHER MEDICAL, Hyperlipidemia, Hypertension, Leg swelling, OCD (obsessive compulsive disorder), Osteomyelitis ([address] Pre-diabetes, Seasonal allergies, Shortness of breath on exertion, and Wears glasses. and presents with     Plan:  Obesity: gained 34 lbs, recommended adipex, he is on weight watchers  Pre diabetes:last hA1C is 5.4, great control. lost a lot of weight, recommend not be so strict  HTN: stable, off hctz and clonidine, there was not recall on his losartan batch, will continue it and continue lopressor  Leg swelling: From CVI, continue compression socks, venous doppler reviewed, no need for venus ablation,cotninue lasix  Dyslipidemia: stable continue statin  Health maintenance: exerise and diet  All labs, medications and tests reviewed, continue all other medications of all above medical condition listed as is.

## 2023-06-07 ENCOUNTER — PROCEDURE VISIT (OUTPATIENT)
Dept: CARDIOLOGY CLINIC | Age: 78
End: 2023-06-07
Payer: MEDICARE

## 2023-06-07 ENCOUNTER — OFFICE VISIT (OUTPATIENT)
Dept: CARDIOLOGY CLINIC | Age: 78
End: 2023-06-07
Payer: MEDICARE

## 2023-06-07 VITALS
DIASTOLIC BLOOD PRESSURE: 100 MMHG | BODY MASS INDEX: 30.6 KG/M2 | WEIGHT: 218.6 LBS | SYSTOLIC BLOOD PRESSURE: 156 MMHG | HEIGHT: 71 IN | HEART RATE: 56 BPM

## 2023-06-07 DIAGNOSIS — I10 HYPERTENSION, UNSPECIFIED TYPE: Primary | ICD-10-CM

## 2023-06-07 DIAGNOSIS — I51.7 LVH (LEFT VENTRICULAR HYPERTROPHY): Primary | ICD-10-CM

## 2023-06-07 DIAGNOSIS — I10 HYPERTENSION, UNSPECIFIED TYPE: ICD-10-CM

## 2023-06-07 LAB
LV EF: 58 %
LVEF MODALITY: NORMAL

## 2023-06-07 PROCEDURE — 93000 ELECTROCARDIOGRAM COMPLETE: CPT | Performed by: INTERNAL MEDICINE

## 2023-06-07 PROCEDURE — 3077F SYST BP >= 140 MM HG: CPT | Performed by: INTERNAL MEDICINE

## 2023-06-07 PROCEDURE — 3080F DIAST BP >= 90 MM HG: CPT | Performed by: INTERNAL MEDICINE

## 2023-06-07 PROCEDURE — 1123F ACP DISCUSS/DSCN MKR DOCD: CPT | Performed by: INTERNAL MEDICINE

## 2023-06-07 PROCEDURE — 93306 TTE W/DOPPLER COMPLETE: CPT | Performed by: INTERNAL MEDICINE

## 2023-06-07 PROCEDURE — 99214 OFFICE O/P EST MOD 30 MIN: CPT | Performed by: INTERNAL MEDICINE

## 2023-06-07 RX ORDER — LOSARTAN POTASSIUM 50 MG/1
75 TABLET ORAL DAILY
Qty: 30 TABLET | Refills: 3 | Status: SHIPPED | OUTPATIENT
Start: 2023-06-07

## 2023-06-07 NOTE — PROGRESS NOTES
Davide Matias MD        OFFICE  FOLLOWUP NOTE    Chief complaints: patient is here for management of  leg swelling, HTN, dyslipidemia and obesitym CVI  Subjective: patient feels better, no chest pain, no shortness of breath, no dizziness, no palpitations    HPI Colt Madsen is a 70 y. o.year old who  has a past medical history of Anemia, Back problem, Diabetes mellitus ([facility] [address] Fall, H/O Doppler ultrasound, H/O echocardiogram, History of nuclear stress test, [place] (hard of hearing), HX OTHER MEDICAL, Hyperlipidemia, Hypertension, Leg swelling, OCD (obsessive compulsive disorder), Osteomyelitis ([facility] [address] Pre-diabetes, Seasonal allergies, Shortness of breath on exertion, and Wears glasses. and presents for management of  leg swelling, HTN, dyslipidemia and obesitym CVI which are well controlled      Current Outpatient Medications   Medication Sig Dispense Refill    ferrous sulfate 325 (65 Fe) MG tablet Take 1 tablet by mouth daily (with breakfast)      losartan (COZAAR) 50 MG tablet Take 1 tablet by mouth daily 30 tablet 3    Omega-3 Fatty Acids (EQL OMEGA 3 FISH OIL) 1400 MG CAPS Take 1 capsule by mouth daily      Compression Stockings MISC by Does not apply route 1 each 0    atorvastatin (LIPITOR) 10 MG tablet TAKE 1 TABLET BY MOUTH AT BEDTIME  3    furosemide (LASIX) 40 MG tablet TAKE 1 TABLET(S) BY MOUTH DAILY   pt takes in evening  2    meloxicam (MOBIC) 15 MG tablet Take 1 tablet by mouth daily as needed      metoprolol succinate (TOPROL XL) 50 MG extended release tablet Take 0.5 tablets by mouth daily      Multiple Vitamins-Minerals (MULTIVITAMIN PO) Take  by mouth every morning. Over The Counter      aspirin 81 MG tablet Take 1 tablet by mouth nightly Over The Counter       No current facility-administered medications for this visit. Allergies:  Other, Unable to assess, Vicodin [hydrocodone-acetaminophen], Codeine, and Tape [adhesive tape]  Past Medical History:   Diagnosis Date    Anemia 1982

## 2023-07-12 ENCOUNTER — OFFICE VISIT (OUTPATIENT)
Dept: CARDIOLOGY CLINIC | Age: 78
End: 2023-07-12
Payer: MEDICARE

## 2023-07-12 VITALS
BODY MASS INDEX: 30.44 KG/M2 | SYSTOLIC BLOOD PRESSURE: 132 MMHG | HEART RATE: 60 BPM | HEIGHT: 71 IN | WEIGHT: 217.4 LBS | DIASTOLIC BLOOD PRESSURE: 90 MMHG

## 2023-07-12 DIAGNOSIS — I51.7 LVH (LEFT VENTRICULAR HYPERTROPHY): ICD-10-CM

## 2023-07-12 DIAGNOSIS — I10 HYPERTENSION, UNSPECIFIED TYPE: Primary | ICD-10-CM

## 2023-07-12 DIAGNOSIS — E78.5 DYSLIPIDEMIA: ICD-10-CM

## 2023-07-12 PROCEDURE — 1123F ACP DISCUSS/DSCN MKR DOCD: CPT | Performed by: INTERNAL MEDICINE

## 2023-07-12 PROCEDURE — 3080F DIAST BP >= 90 MM HG: CPT | Performed by: INTERNAL MEDICINE

## 2023-07-12 PROCEDURE — 3075F SYST BP GE 130 - 139MM HG: CPT | Performed by: INTERNAL MEDICINE

## 2023-07-12 PROCEDURE — 99214 OFFICE O/P EST MOD 30 MIN: CPT | Performed by: INTERNAL MEDICINE

## 2023-07-12 NOTE — PROGRESS NOTES
Rosalba Canada MD        OFFICE  FOLLOWUP NOTE    Chief complaints: patient is here for management of leg swelling, HTN, dyslipidemia and obesity CVI  Subjective: patient feels better, no chest pain, no shortness of breath, no dizziness, no palpitations    HPI Timothy Taylor is a 70 y. o.year old who  has a past medical history of Anemia, Back problem, Diabetes mellitus ([address]), Fall, H/O Doppler ultrasound, H/O echocardiogram, H/O echocardiogram, History of nuclear stress test, [place] (hard of hearing), HX OTHER MEDICAL, Hyperlipidemia, Hypertension, Leg swelling, OCD (obsessive compulsive disorder), Osteomyelitis ([address]), Pre-diabetes, Seasonal allergies, Shortness of breath on exertion, and Wears glasses. and presents for management of leg swelling, HTN, dyslipidemia and obesity CVI which are well controlled      Current Outpatient Medications   Medication Sig Dispense Refill    losartan (COZAAR) 50 MG tablet Take 1.5 tablets by mouth daily 30 tablet 3    ferrous sulfate 325 (65 Fe) MG tablet Take 1 tablet by mouth daily (with breakfast)      Omega-3 Fatty Acids (EQL OMEGA 3 FISH OIL) 1400 MG CAPS Take 1 capsule by mouth daily      Compression Stockings MISC by Does not apply route 1 each 0    atorvastatin (LIPITOR) 10 MG tablet TAKE 1 TABLET BY MOUTH AT BEDTIME  3    furosemide (LASIX) 40 MG tablet TAKE 1 TABLET(S) BY MOUTH DAILY   pt takes in evening  2    meloxicam (MOBIC) 15 MG tablet Take 1 tablet by mouth daily as needed      metoprolol succinate (TOPROL XL) 50 MG extended release tablet Take 0.5 tablets by mouth daily      Multiple Vitamins-Minerals (MULTIVITAMIN PO) Take  by mouth every morning. Over The Counter      aspirin 81 MG tablet Take 1 tablet by mouth nightly Over The Counter       No current facility-administered medications for this visit. Allergies:  Other, Unable to assess, Vicodin [hydrocodone-acetaminophen], Codeine, and Tape [adhesive tape]  Past Medical History:   Diagnosis Date

## 2023-09-25 RX ORDER — LOSARTAN POTASSIUM 50 MG/1
75 TABLET ORAL DAILY
Qty: 30 TABLET | Refills: 11 | Status: SHIPPED | OUTPATIENT
Start: 2023-09-25

## 2024-01-17 ENCOUNTER — OFFICE VISIT (OUTPATIENT)
Dept: CARDIOLOGY CLINIC | Age: 79
End: 2024-01-17
Payer: MEDICARE

## 2024-01-17 VITALS
BODY MASS INDEX: 32.06 KG/M2 | WEIGHT: 229 LBS | DIASTOLIC BLOOD PRESSURE: 74 MMHG | HEART RATE: 80 BPM | SYSTOLIC BLOOD PRESSURE: 110 MMHG | HEIGHT: 71 IN

## 2024-01-17 DIAGNOSIS — E78.2 MIXED HYPERLIPIDEMIA: Primary | ICD-10-CM

## 2024-01-17 DIAGNOSIS — I87.2 VENOUS REFLUX: ICD-10-CM

## 2024-01-17 DIAGNOSIS — I10 PRIMARY HYPERTENSION: ICD-10-CM

## 2024-01-17 PROCEDURE — 99214 OFFICE O/P EST MOD 30 MIN: CPT | Performed by: NURSE PRACTITIONER

## 2024-01-17 PROCEDURE — 1123F ACP DISCUSS/DSCN MKR DOCD: CPT | Performed by: NURSE PRACTITIONER

## 2024-01-17 PROCEDURE — 3078F DIAST BP <80 MM HG: CPT | Performed by: NURSE PRACTITIONER

## 2024-01-17 PROCEDURE — 3074F SYST BP LT 130 MM HG: CPT | Performed by: NURSE PRACTITIONER

## 2024-01-17 RX ORDER — METOPROLOL SUCCINATE 25 MG/1
25 TABLET, EXTENDED RELEASE ORAL DAILY
Qty: 90 TABLET | Refills: 3 | Status: SHIPPED | OUTPATIENT
Start: 2024-01-17

## 2024-01-17 ASSESSMENT — ENCOUNTER SYMPTOMS: SHORTNESS OF BREATH: 0

## 2024-01-17 NOTE — PROGRESS NOTES
[place] [address]  Phone: [phone]    Fax [phone]    Mitchel Go MD, [facility]  Lavon Wayne MD, [facility]   Tone Healy MD, [facility] MD Brittny Holm MD, [facility]  Israel Fuentes MD, [facility]    Jason Barnett MD, [facility]  Fan Cerda MD, [facility]  Zehra Coello, APRN  Jeana Chaudhry, APRN  Scarlett Riley, APRN  Leo Holland, APRN        Cardiology Progress Note      1/17/2024    RE: Jf Aquino  (1945)                             Primary cardiologist: Dr. Yen Campo       Subjective:  CC:   1. Mixed hyperlipidemia    2. Primary hypertension    3. Venous reflux        HPI: Jf Aquino, who is a  78 y.o. year old male with a past medical history as listed below.  Patient presents to the office for follow up on venous reflux, HTN, and hyperlipidemia. Patient is  an active male who walks regularly. Patient is  compliant with medications.  Patient denies any chest pain, shortness of breath, dizziness, syncope, or palpitations.    Past Medical History:   Diagnosis Date    Anemia 1982    \"While In The Hospital\"    Back problem     \"Back Goes Out Sometimes, Have Couple Herniated Discs That Cause My Feet To Go Numb\"    Diabetes mellitus (HCC)     \"Borderline\"    Fall 1982    \"Fell Off Ladder\" -- Lt ankle and Rt heel fractures    H/O Doppler ultrasound 02/26/2019    No evidence of DVT or SVT in the bilateral common femoral vein, femoral  vein, popliteal vein, greater saphenous vein or small saphenous vein. Significant reflux noted of the Right mid femoral vein. Significant reflux noted in the Left Popliteal vein.    H/O echocardiogram 10/18/2017    EF 55-60. Grade I diastolic dysfunction. Mild concentric left ventricular hypertrophy. The left atrium is mildly dilated.  No significant valvulopathy seen.    H/O echocardiogram 06/07/2023    EF 55-60%. Sclerotic, but non-stenotic aortic valve. Dilatation of the ascending aorta measuring

## 2024-01-17 NOTE — ASSESSMENT & PLAN NOTE
-At or near goal Yes LDL- 80  -He is to continue current medications (Lipitor 10 mg) Hepatic function panel WNL. No abdominal pain. No myalgias.     -The nature of cardiac risk has been fully discussed with this patient. I have made him aware of his LDL target goal given his cardiovascular risk analysis. I have discussed the appropriate diet. The need for lifelong compliance in order to reduce risk is stressed. A regular exercise program is recommended to help achieve and maintain normal body weight, fitness and improve lipid balance.

## 2024-07-09 RX ORDER — LOSARTAN POTASSIUM 50 MG/1
50 TABLET ORAL DAILY
COMMUNITY

## 2024-07-15 RX ORDER — METOPROLOL SUCCINATE 25 MG/1
25 TABLET, EXTENDED RELEASE ORAL DAILY
Qty: 90 TABLET | Refills: 3 | Status: SHIPPED | OUTPATIENT
Start: 2024-07-15

## 2024-07-17 ENCOUNTER — OFFICE VISIT (OUTPATIENT)
Dept: CARDIOLOGY CLINIC | Age: 79
End: 2024-07-17
Payer: MEDICARE

## 2024-07-17 VITALS
HEART RATE: 58 BPM | HEIGHT: 71 IN | SYSTOLIC BLOOD PRESSURE: 130 MMHG | DIASTOLIC BLOOD PRESSURE: 88 MMHG | BODY MASS INDEX: 33.04 KG/M2 | WEIGHT: 236 LBS

## 2024-07-17 DIAGNOSIS — E78.2 MIXED HYPERLIPIDEMIA: ICD-10-CM

## 2024-07-17 DIAGNOSIS — I87.2 VENOUS REFLUX: Primary | ICD-10-CM

## 2024-07-17 DIAGNOSIS — I10 PRIMARY HYPERTENSION: ICD-10-CM

## 2024-07-17 PROCEDURE — 1123F ACP DISCUSS/DSCN MKR DOCD: CPT | Performed by: NURSE PRACTITIONER

## 2024-07-17 PROCEDURE — 3079F DIAST BP 80-89 MM HG: CPT | Performed by: NURSE PRACTITIONER

## 2024-07-17 PROCEDURE — 3075F SYST BP GE 130 - 139MM HG: CPT | Performed by: NURSE PRACTITIONER

## 2024-07-17 PROCEDURE — 99214 OFFICE O/P EST MOD 30 MIN: CPT | Performed by: NURSE PRACTITIONER

## 2024-07-17 PROCEDURE — 93000 ELECTROCARDIOGRAM COMPLETE: CPT | Performed by: NURSE PRACTITIONER

## 2024-07-17 ASSESSMENT — ENCOUNTER SYMPTOMS: SHORTNESS OF BREATH: 0

## 2024-07-17 NOTE — PROGRESS NOTES
[place] [address]  Phone: [phone]    Fax [phone]    Mitchel Go MD, [facility]  Lavon Wayne MD, [facility]   Tone Healy MD, [facility] MD Brittny Holm MD, [facility]  Israel Fuentes MD, [facility]    Jason Barnett MD, [facility]  Fan Cerda MD, [facility]  Zehra Coello, APRN  Jeana Chaudhry, APRN  Scarlett Riley, APRN  Leo Holland, APRN        Cardiology Progress Note      7/17/2024    RE: Jf Aquino  (1945)                             Primary cardiologist: Dr. Yen Campo       Subjective:  CC:   1. Venous reflux    2. Primary hypertension    3. Mixed hyperlipidemia        HPI: Jf Aquino, who is a  79 y.o. year old male with a past medical history as listed below.  Patient presents to the office for follow up on venous reflux, HTN, and hyperlipidemia. Patient is  an active male who walks regularly. Patient is  compliant with medications.  Patient denies any chest pain, shortness of breath, dizziness, syncope, or palpitations.    Past Medical History:   Diagnosis Date    Anemia 1982    \"While In The Hospital\"    Back problem     \"Back Goes Out Sometimes, Have Couple Herniated Discs That Cause My Feet To Go Numb\"    Diabetes mellitus (HCC)     \"Borderline\"    Fall 1982    \"Fell Off Ladder\" -- Lt ankle and Rt heel fractures    H/O Doppler ultrasound 02/26/2019    No evidence of DVT or SVT in the bilateral common femoral vein, femoral  vein, popliteal vein, greater saphenous vein or small saphenous vein. Significant reflux noted of the Right mid femoral vein. Significant reflux noted in the Left Popliteal vein.    H/O echocardiogram 10/18/2017    EF 55-60. Grade I diastolic dysfunction. Mild concentric left ventricular hypertrophy. The left atrium is mildly dilated.  No significant valvulopathy seen.    H/O echocardiogram 06/07/2023    EF 55-60%. Sclerotic, but non-stenotic aortic valve. Dilatation of the ascending aorta measuring

## 2024-11-21 RX ORDER — LOSARTAN POTASSIUM 50 MG/1
TABLET ORAL
Qty: 60 TABLET | Refills: 6 | OUTPATIENT
Start: 2024-11-21

## 2025-02-18 ENCOUNTER — OFFICE VISIT (OUTPATIENT)
Dept: CARDIOLOGY CLINIC | Age: 80
End: 2025-02-18
Payer: MEDICARE

## 2025-02-18 VITALS
SYSTOLIC BLOOD PRESSURE: 130 MMHG | WEIGHT: 241 LBS | HEIGHT: 71 IN | DIASTOLIC BLOOD PRESSURE: 86 MMHG | BODY MASS INDEX: 33.74 KG/M2 | HEART RATE: 80 BPM

## 2025-02-18 DIAGNOSIS — R06.02 SOB (SHORTNESS OF BREATH): ICD-10-CM

## 2025-02-18 DIAGNOSIS — E78.2 MIXED HYPERLIPIDEMIA: ICD-10-CM

## 2025-02-18 DIAGNOSIS — I87.2 VENOUS REFLUX: Primary | ICD-10-CM

## 2025-02-18 DIAGNOSIS — I10 PRIMARY HYPERTENSION: ICD-10-CM

## 2025-02-18 PROCEDURE — 3079F DIAST BP 80-89 MM HG: CPT | Performed by: NURSE PRACTITIONER

## 2025-02-18 PROCEDURE — 1159F MED LIST DOCD IN RCRD: CPT | Performed by: NURSE PRACTITIONER

## 2025-02-18 PROCEDURE — 99214 OFFICE O/P EST MOD 30 MIN: CPT | Performed by: NURSE PRACTITIONER

## 2025-02-18 PROCEDURE — 3075F SYST BP GE 130 - 139MM HG: CPT | Performed by: NURSE PRACTITIONER

## 2025-02-18 PROCEDURE — 1123F ACP DISCUSS/DSCN MKR DOCD: CPT | Performed by: NURSE PRACTITIONER

## 2025-02-18 RX ORDER — TORSEMIDE 20 MG/1
20 TABLET ORAL DAILY
Qty: 30 TABLET | Refills: 3 | Status: SHIPPED | OUTPATIENT
Start: 2025-02-18

## 2025-02-18 ASSESSMENT — ENCOUNTER SYMPTOMS: SHORTNESS OF BREATH: 0

## 2025-03-19 ENCOUNTER — OFFICE VISIT (OUTPATIENT)
Dept: CARDIOLOGY CLINIC | Age: 80
End: 2025-03-19
Payer: MEDICARE

## 2025-03-19 VITALS
SYSTOLIC BLOOD PRESSURE: 138 MMHG | HEART RATE: 68 BPM | DIASTOLIC BLOOD PRESSURE: 86 MMHG | BODY MASS INDEX: 32.9 KG/M2 | HEIGHT: 71 IN | WEIGHT: 235 LBS

## 2025-03-19 DIAGNOSIS — E78.2 MIXED HYPERLIPIDEMIA: ICD-10-CM

## 2025-03-19 DIAGNOSIS — I87.2 VENOUS REFLUX: ICD-10-CM

## 2025-03-19 DIAGNOSIS — R06.02 SOB (SHORTNESS OF BREATH): ICD-10-CM

## 2025-03-19 DIAGNOSIS — I10 PRIMARY HYPERTENSION: Primary | ICD-10-CM

## 2025-03-19 PROCEDURE — 1159F MED LIST DOCD IN RCRD: CPT | Performed by: NURSE PRACTITIONER

## 2025-03-19 PROCEDURE — 99214 OFFICE O/P EST MOD 30 MIN: CPT | Performed by: NURSE PRACTITIONER

## 2025-03-19 PROCEDURE — 1123F ACP DISCUSS/DSCN MKR DOCD: CPT | Performed by: NURSE PRACTITIONER

## 2025-03-19 PROCEDURE — 3079F DIAST BP 80-89 MM HG: CPT | Performed by: NURSE PRACTITIONER

## 2025-03-19 PROCEDURE — 3075F SYST BP GE 130 - 139MM HG: CPT | Performed by: NURSE PRACTITIONER

## 2025-03-19 ASSESSMENT — ENCOUNTER SYMPTOMS: SHORTNESS OF BREATH: 1

## 2025-03-19 NOTE — PROGRESS NOTES
Plan:     Hypertension   -Stable, continue with losartan 50 mg daily and Toprol XL 25 mg daily.     SOB  -SOB at rest and increase in lower leg edema in the last month. Reports higher sodium intake. Previously changed lasix to torsemide 20 mg daily with some improvement in edema.  Can take extra dose of torsemide as needed for leg edema. Echo unremarkable, last stress test was in 2017 which showed no ischemia. Will get stress test.    Hyperlipidemia   -At or near goal Yes LDL- 91  -He is to continue current medications (Lipitor 10 mg) Hepatic function panel WNL. No abdominal pain. No myalgias.     -The nature of cardiac risk has been fully discussed with this patient. I have made him aware of his LDL target goal given his cardiovascular risk analysis. I have discussed the appropriate diet. The need for lifelong compliance in order to reduce risk is stressed. A regular exercise program is recommended to help achieve and maintain normal body weight, fitness and improve lipid balance.      Venous reflux  -Venous ultrasound 2/27/2029: Significant reflux of right mid femoral vein and left popliteal vein. Patient reports increased edema with high sodium intake.  Continue with compression stockings.       Patient seen, interviewed and examined. Testing was reviewed.      Lifestyle and risk factor modificatons discussed. Various goals are discussed and questions answered. Continue current medications. Appropriate prescriptions are addressed.  Questions answered and patient verbalizes understanding.     Call for any problems, questions, or concerns.    Pt is to follow up in 3 months for Cardiac management    -Voice recognition software used for portions of written documentation and may not reflect accurate statements    Electronically signed by GERARDO Mir CNP on 3/19/2025 at 3:39 PM

## 2025-03-26 ENCOUNTER — RESULTS FOLLOW-UP (OUTPATIENT)
Dept: CARDIOLOGY CLINIC | Age: 80
End: 2025-03-26

## 2025-04-22 ENCOUNTER — OFFICE VISIT (OUTPATIENT)
Dept: CARDIOLOGY CLINIC | Age: 80
End: 2025-04-22
Payer: MEDICARE

## 2025-04-22 VITALS
SYSTOLIC BLOOD PRESSURE: 134 MMHG | HEIGHT: 71 IN | WEIGHT: 231 LBS | DIASTOLIC BLOOD PRESSURE: 86 MMHG | BODY MASS INDEX: 32.34 KG/M2 | HEART RATE: 76 BPM

## 2025-04-22 DIAGNOSIS — R06.02 SHORTNESS OF BREATH: ICD-10-CM

## 2025-04-22 DIAGNOSIS — E78.2 MIXED HYPERLIPIDEMIA: ICD-10-CM

## 2025-04-22 DIAGNOSIS — I10 PRIMARY HYPERTENSION: ICD-10-CM

## 2025-04-22 DIAGNOSIS — I87.2 VENOUS REFLUX: Primary | ICD-10-CM

## 2025-04-22 PROCEDURE — 1159F MED LIST DOCD IN RCRD: CPT | Performed by: NURSE PRACTITIONER

## 2025-04-22 PROCEDURE — 3079F DIAST BP 80-89 MM HG: CPT | Performed by: NURSE PRACTITIONER

## 2025-04-22 PROCEDURE — 99214 OFFICE O/P EST MOD 30 MIN: CPT | Performed by: NURSE PRACTITIONER

## 2025-04-22 PROCEDURE — 3075F SYST BP GE 130 - 139MM HG: CPT | Performed by: NURSE PRACTITIONER

## 2025-04-22 PROCEDURE — 1123F ACP DISCUSS/DSCN MKR DOCD: CPT | Performed by: NURSE PRACTITIONER

## 2025-04-22 RX ORDER — TORSEMIDE 20 MG/1
TABLET ORAL
Qty: 60 TABLET | Refills: 3 | Status: SHIPPED | OUTPATIENT
Start: 2025-04-22

## 2025-04-22 RX ORDER — METOPROLOL TARTRATE 50 MG
TABLET ORAL
Qty: 2 TABLET | Refills: 0 | Status: CANCELLED | OUTPATIENT
Start: 2025-04-22

## 2025-04-22 ASSESSMENT — ENCOUNTER SYMPTOMS: SHORTNESS OF BREATH: 0

## 2025-04-22 NOTE — PROGRESS NOTES
[place] [address]  Phone: [phone]    Fax [phone]    Mitchel Go MD, [facility]  Lavon Wayne MD, [facility]   Tone Healy MD, [facility] MD Brittny Holm MD, [facility]  Israel Fuentes MD, [facility]    Jason Barnett MD, [facility]  Fan Cerda MD, [facility]  Zehra Coello, APRN  Jeana Chaudhry, APRN  Scarlett Riley, APRN  Leo Holland, APRN        Cardiology Progress Note      4/22/2025    RE: Jf Aquino  (1945)                             Primary cardiologist: Dr. Yen Campo       Subjective:  CC:   1. Venous reflux    2. Primary hypertension    3. Mixed hyperlipidemia    4. Shortness of breath          HPI: Jf Aquino, who is a  80 y.o. year old male with a past medical history as listed below.  Patient presents to the office for follow up on venous reflux, HTN, and hyperlipidemia.  Patient has ongoing lower extremity edema and new shortness of breath.  Patient reports having viral infection but followed up with cardiology around that time and completed stress test.  Stress test abnormal reading fixed perfusion defect in inferior wall versus artifact.  Patient reports symptoms have since resolved.    Past Medical History:   Diagnosis Date    Anemia 1982    \"While In The Hospital\"    Back problem     \"Back Goes Out Sometimes, Have Couple Herniated Discs That Cause My Feet To Go Numb\"    Diabetes mellitus (HCC)     \"Borderline\"    Fall 1982    \"Fell Off Ladder\" -- Lt ankle and Rt heel fractures    H/O Doppler ultrasound 02/26/2019    No evidence of DVT or SVT in the bilateral common femoral vein, femoral  vein, popliteal vein, greater saphenous vein or small saphenous vein. Significant reflux noted of the Right mid femoral vein. Significant reflux noted in the Left Popliteal vein.    H/O echocardiogram 10/18/2017    EF 55-60. Grade I diastolic dysfunction. Mild concentric left ventricular hypertrophy. The left atrium is mildly

## 2025-06-23 ENCOUNTER — HOSPITAL ENCOUNTER (OUTPATIENT)
Age: 80
Setting detail: SPECIMEN
Discharge: HOME | End: 2025-06-23

## 2025-06-23 LAB
ALBUMIN SERPL-MCNC: 2.9 G/DL (ref 3.4–5)
ALBUMIN/GLOB SERPL: 1.3 {RATIO} (ref 1.1–2.2)
ALP SERPL-CCNC: 120 U/L (ref 40–129)
ALT SERPL-CCNC: 67 U/L (ref 10–40)
ANION GAP SERPL CALCULATED.3IONS-SCNC: 11 MMOL/L (ref 9–17)
AST SERPL-CCNC: 44 U/L (ref 15–37)
BILIRUB SERPL-MCNC: 0.6 MG/DL (ref 0–1)
BUN SERPL-MCNC: 17 MG/DL (ref 7–20)
CALCIUM SERPL-MCNC: 8.5 MG/DL (ref 8.3–10.6)
CHLORIDE SERPL-SCNC: 97 MMOL/L (ref 99–110)
CO2 SERPL-SCNC: 29 MMOL/L (ref 21–32)
CREAT SERPL-MCNC: 0.8 MG/DL (ref 0.8–1.3)
ERYTHROCYTE [DISTWIDTH] IN BLOOD BY AUTOMATED COUNT: 13.2 % (ref 11.7–14.9)
EST. AVERAGE GLUCOSE BLD GHB EST-MCNC: 121 MG/DL
GFR, ESTIMATED: >90 ML/MIN/1.73M2
GLUCOSE SERPL-MCNC: 128 MG/DL (ref 74–99)
HBA1C MFR BLD: 5.8 % (ref 4.2–6.3)
HCT VFR BLD AUTO: 38.4 % (ref 42–52)
HGB BLD-MCNC: 12.3 G/DL (ref 13.5–18)
MCH RBC QN AUTO: 30.1 PG (ref 27–31)
MCHC RBC AUTO-ENTMCNC: 32 G/DL (ref 32–36)
MCV RBC AUTO: 93.9 FL (ref 78–100)
PLATELET # BLD AUTO: 411 K/UL (ref 140–440)
PMV BLD AUTO: 8.6 FL (ref 7.5–11.1)
POTASSIUM SERPL-SCNC: 3.8 MMOL/L (ref 3.5–5.1)
PROT SERPL-MCNC: 5.2 G/DL (ref 6.4–8.2)
RBC # BLD AUTO: 4.09 M/UL (ref 4.6–6.2)
SODIUM SERPL-SCNC: 137 MMOL/L (ref 136–145)
WBC OTHER # BLD: 9.3 K/UL (ref 4–10.5)

## 2025-06-23 PROCEDURE — 80053 COMPREHEN METABOLIC PANEL: CPT

## 2025-06-23 PROCEDURE — 83036 HEMOGLOBIN GLYCOSYLATED A1C: CPT

## 2025-06-23 PROCEDURE — 85027 COMPLETE CBC AUTOMATED: CPT

## 2025-07-03 ENCOUNTER — HOSPITAL ENCOUNTER (OUTPATIENT)
Age: 80
Setting detail: SPECIMEN
Discharge: HOME | End: 2025-07-03

## 2025-07-03 LAB
ALBUMIN SERPL-MCNC: 3.3 G/DL (ref 3.4–5)
ALBUMIN/GLOB SERPL: 1.1 {RATIO} (ref 1.1–2.2)
ALP SERPL-CCNC: 145 U/L (ref 40–129)
ALT SERPL-CCNC: 49 U/L (ref 10–40)
ANION GAP SERPL CALCULATED.3IONS-SCNC: 19 MMOL/L (ref 9–17)
AST SERPL-CCNC: 43 U/L (ref 15–37)
BILIRUB SERPL-MCNC: 0.8 MG/DL (ref 0–1)
BUN SERPL-MCNC: 23 MG/DL (ref 7–20)
CALCIUM SERPL-MCNC: 9.1 MG/DL (ref 8.3–10.6)
CHLORIDE SERPL-SCNC: 93 MMOL/L (ref 99–110)
CO2 SERPL-SCNC: 25 MMOL/L (ref 21–32)
CREAT SERPL-MCNC: 0.9 MG/DL (ref 0.8–1.3)
ERYTHROCYTE [DISTWIDTH] IN BLOOD BY AUTOMATED COUNT: 13.2 % (ref 11.7–14.9)
GFR, ESTIMATED: 82 ML/MIN/1.73M2
GLUCOSE SERPL-MCNC: 114 MG/DL (ref 74–99)
HCT VFR BLD AUTO: 41.6 % (ref 42–52)
HGB BLD-MCNC: 13.2 G/DL (ref 13.5–18)
MCH RBC QN AUTO: 30.2 PG (ref 27–31)
MCHC RBC AUTO-ENTMCNC: 31.7 G/DL (ref 32–36)
MCV RBC AUTO: 95.2 FL (ref 78–100)
PLATELET # BLD AUTO: 397 K/UL (ref 140–440)
PMV BLD AUTO: 9.7 FL (ref 7.5–11.1)
POTASSIUM SERPL-SCNC: 3.7 MMOL/L (ref 3.5–5.1)
PROT SERPL-MCNC: 6.3 G/DL (ref 6.4–8.2)
RBC # BLD AUTO: 4.37 M/UL (ref 4.6–6.2)
SODIUM SERPL-SCNC: 137 MMOL/L (ref 136–145)
WBC OTHER # BLD: 11.1 K/UL (ref 4–10.5)

## 2025-07-03 PROCEDURE — 80053 COMPREHEN METABOLIC PANEL: CPT

## 2025-07-03 PROCEDURE — 85027 COMPLETE CBC AUTOMATED: CPT

## 2025-07-15 ENCOUNTER — HOSPITAL ENCOUNTER (OUTPATIENT)
Age: 80
Setting detail: SPECIMEN
Discharge: HOME | End: 2025-07-15
Payer: MEDICARE

## 2025-07-15 LAB
ALBUMIN SERPL-MCNC: 3 G/DL (ref 3.4–5)
ALBUMIN/GLOB SERPL: 1.5 {RATIO} (ref 1.1–2.2)
ALP SERPL-CCNC: 99 U/L (ref 40–129)
ALT SERPL-CCNC: 20 U/L (ref 10–40)
ANION GAP SERPL CALCULATED.3IONS-SCNC: 10 MMOL/L (ref 9–17)
AST SERPL-CCNC: 19 U/L (ref 15–37)
BILIRUB SERPL-MCNC: 0.8 MG/DL (ref 0–1)
BUN SERPL-MCNC: 22 MG/DL (ref 7–20)
CALCIUM SERPL-MCNC: 8.6 MG/DL (ref 8.3–10.6)
CHLORIDE SERPL-SCNC: 99 MMOL/L (ref 99–110)
CO2 SERPL-SCNC: 28 MMOL/L (ref 21–32)
CREAT SERPL-MCNC: 0.7 MG/DL (ref 0.8–1.3)
ERYTHROCYTE [DISTWIDTH] IN BLOOD BY AUTOMATED COUNT: 13.2 % (ref 11.7–14.9)
GFR, ESTIMATED: >90 ML/MIN/1.73M2
GLUCOSE SERPL-MCNC: 138 MG/DL (ref 74–99)
HCT VFR BLD AUTO: 30.7 % (ref 42–52)
HGB BLD-MCNC: 10.1 G/DL (ref 13.5–18)
MCH RBC QN AUTO: 29.6 PG (ref 27–31)
MCHC RBC AUTO-ENTMCNC: 32.9 G/DL (ref 32–36)
MCV RBC AUTO: 90 FL (ref 78–100)
PLATELET # BLD AUTO: 149 K/UL (ref 140–440)
PMV BLD AUTO: 9.3 FL (ref 7.5–11.1)
POTASSIUM SERPL-SCNC: 3.6 MMOL/L (ref 3.5–5.1)
PROT SERPL-MCNC: 5.1 G/DL (ref 6.4–8.2)
RBC # BLD AUTO: 3.41 M/UL (ref 4.6–6.2)
SODIUM SERPL-SCNC: 136 MMOL/L (ref 136–145)
WBC OTHER # BLD: 2.5 K/UL (ref 4–10.5)

## 2025-07-15 PROCEDURE — 80053 COMPREHEN METABOLIC PANEL: CPT

## 2025-07-15 PROCEDURE — 85027 COMPLETE CBC AUTOMATED: CPT

## 2025-07-21 ENCOUNTER — HOSPITAL ENCOUNTER (OUTPATIENT)
Age: 80
Setting detail: SPECIMEN
Discharge: HOME | End: 2025-07-21

## 2025-07-21 PROCEDURE — 85025 COMPLETE CBC W/AUTO DIFF WBC: CPT

## 2025-07-22 LAB
ABSOLUTE BANDS: 0.01 K/UL
ATYPICAL LYMPHOCYTE ABSOLUTE COUNT: 0.04 K/UL
ATYPICAL LYMPHOCYTES: 4 %
BANDS: 1 %
BASOPHILS # BLD: 0 K/UL
BASOPHILS NFR BLD: 0 % (ref 0–1)
EOSINOPHIL # BLD: 0.04 K/UL
EOSINOPHILS RELATIVE PERCENT: 4 % (ref 0–3)
ERYTHROCYTE [DISTWIDTH] IN BLOOD BY AUTOMATED COUNT: 12.8 % (ref 11.7–14.9)
HCT VFR BLD AUTO: 32.4 % (ref 42–52)
HGB BLD-MCNC: 10.6 G/DL (ref 13.5–18)
LYMPHOCYTES NFR BLD: 0.74 K/UL
LYMPHOCYTES RELATIVE PERCENT: 67 % (ref 24–44)
MCH RBC QN AUTO: 29.2 PG (ref 27–31)
MCHC RBC AUTO-ENTMCNC: 32.7 G/DL (ref 32–36)
MCV RBC AUTO: 89.3 FL (ref 78–100)
MONOCYTES NFR BLD: 0.13 K/UL
MONOCYTES NFR BLD: 12 % (ref 0–5)
NEUTROPHILS NFR BLD: 12 % (ref 36–66)
NEUTS SEG NFR BLD: 0.13 K/UL
PLATELET CONFIRMATION: NORMAL
PLATELET ESTIMATE: NORMAL
PLATELET, FLUORESCENCE: 120 K/UL (ref 140–440)
PMV BLD AUTO: 9.8 FL (ref 7.5–11.1)
RBC # BLD AUTO: 3.63 M/UL (ref 4.6–6.2)
RBC # BLD: ABNORMAL 10*6/UL
RBC # BLD: NORMAL 10*6/UL
WBC # BLD: NORMAL 10*3/UL
WBC OTHER # BLD: 1.1 K/UL (ref 4–10.5)